# Patient Record
Sex: FEMALE | Race: WHITE | Employment: FULL TIME | URBAN - METROPOLITAN AREA
[De-identification: names, ages, dates, MRNs, and addresses within clinical notes are randomized per-mention and may not be internally consistent; named-entity substitution may affect disease eponyms.]

---

## 2021-09-08 ENCOUNTER — VIRTUAL VISIT (OUTPATIENT)
Dept: PRIMARY CARE CLINIC | Age: 41
End: 2021-09-08
Payer: COMMERCIAL

## 2021-09-08 ENCOUNTER — TELEPHONE (OUTPATIENT)
Dept: PRIMARY CARE CLINIC | Age: 41
End: 2021-09-08

## 2021-09-08 DIAGNOSIS — Z76.89 ENCOUNTER TO ESTABLISH CARE: Primary | ICD-10-CM

## 2021-09-08 DIAGNOSIS — Z00.00 ENCOUNTER FOR ANNUAL PHYSICAL EXAM: Primary | ICD-10-CM

## 2021-09-08 DIAGNOSIS — E78.2 MIXED HYPERLIPIDEMIA: ICD-10-CM

## 2021-09-08 PROBLEM — E88.81 METABOLIC SYNDROME: Status: ACTIVE | Noted: 2021-03-16

## 2021-09-08 PROBLEM — E78.5 HYPERLIPIDEMIA: Status: ACTIVE | Noted: 2021-09-08

## 2021-09-08 PROBLEM — E88.810 METABOLIC SYNDROME: Status: ACTIVE | Noted: 2021-03-16

## 2021-09-08 PROCEDURE — 99203 OFFICE O/P NEW LOW 30 MIN: CPT | Performed by: STUDENT IN AN ORGANIZED HEALTH CARE EDUCATION/TRAINING PROGRAM

## 2021-09-08 RX ORDER — ALPRAZOLAM 0.25 MG/1
0.25 TABLET ORAL NIGHTLY PRN
COMMUNITY
Start: 2021-06-20

## 2021-09-08 SDOH — ECONOMIC STABILITY: FOOD INSECURITY: WITHIN THE PAST 12 MONTHS, YOU WORRIED THAT YOUR FOOD WOULD RUN OUT BEFORE YOU GOT MONEY TO BUY MORE.: NEVER TRUE

## 2021-09-08 SDOH — ECONOMIC STABILITY: FOOD INSECURITY: WITHIN THE PAST 12 MONTHS, THE FOOD YOU BOUGHT JUST DIDN'T LAST AND YOU DIDN'T HAVE MONEY TO GET MORE.: NEVER TRUE

## 2021-09-08 ASSESSMENT — ENCOUNTER SYMPTOMS
RHINORRHEA: 0
SHORTNESS OF BREATH: 0
VOMITING: 0
DIARRHEA: 0
CONSTIPATION: 0
COUGH: 0
NAUSEA: 0
SORE THROAT: 0

## 2021-09-08 ASSESSMENT — PATIENT HEALTH QUESTIONNAIRE - PHQ9
SUM OF ALL RESPONSES TO PHQ QUESTIONS 1-9: 0
SUM OF ALL RESPONSES TO PHQ9 QUESTIONS 1 & 2: 0
2. FEELING DOWN, DEPRESSED OR HOPELESS: 0
SUM OF ALL RESPONSES TO PHQ QUESTIONS 1-9: 0
SUM OF ALL RESPONSES TO PHQ QUESTIONS 1-9: 0
1. LITTLE INTEREST OR PLEASURE IN DOING THINGS: 0

## 2021-09-08 ASSESSMENT — SOCIAL DETERMINANTS OF HEALTH (SDOH): HOW HARD IS IT FOR YOU TO PAY FOR THE VERY BASICS LIKE FOOD, HOUSING, MEDICAL CARE, AND HEATING?: NOT HARD AT ALL

## 2021-09-08 NOTE — PROGRESS NOTES
Edmond Jain (:  1980) is a 39 y.o. female,New patient, here for evaluation of the following chief complaint(s): Establish Care         ASSESSMENT/PLAN:  1. Encounter to establish care  2. Mixed hyperlipidemia  She will go to get fasting labs  She will make an appointment for in clinic visit to go over labs. No follow-ups on file. SUBJECTIVE/OBJECTIVE:  HPI    Patient needs PCP and establish care. Is interested in bariatric surgery  Changed jobs recently, works at [employer]  Has tried adipex  Tried victoza and metformin didn't help  Qsymia made her nauseaus    Does not need any more benzos    Review of Systems   Constitutional: Negative for chills and fever. HENT: Negative for congestion, rhinorrhea and sore throat. Eyes: Negative for visual disturbance. Respiratory: Negative for cough and shortness of breath. Cardiovascular: Negative for chest pain. Gastrointestinal: Negative for constipation, diarrhea, nausea and vomiting. Endocrine: Negative for polydipsia and polyuria. Genitourinary: Negative for dysuria. Musculoskeletal: Negative for arthralgias. Skin: Negative for rash. Allergic/Immunologic: Negative for environmental allergies. Neurological: Negative for headaches. Psychiatric/Behavioral: The patient is not nervous/anxious. Patient-Reported Vitals 2021   Patient-Reported Weight 276lb   Patient-Reported Height 5'5.5\"        Physical Exam  Vitals reviewed. Constitutional:       General: She is not in acute distress. Appearance: Normal appearance. She is not ill-appearing. HENT:      Head: Normocephalic and atraumatic. Right Ear: External ear normal.      Left Ear: External ear normal.   Eyes:      General: No scleral icterus. Right eye: No discharge. Left eye: No discharge. Extraocular Movements: Extraocular movements intact.       Conjunctiva/sclera: Conjunctivae normal.   Pulmonary:      Effort: Pulmonary effort is normal. No respiratory distress. Musculoskeletal:      Cervical back: Neck supple. Skin:     Coloration: Skin is not jaundiced. Findings: No lesion or rash. Neurological:      Mental Status: She is alert. Cranial Nerves: No cranial nerve deficit. Coordination: Coordination normal.   Psychiatric:         Mood and Affect: Mood normal.                   Keyanna Umanzor, was evaluated through a synchronous (real-time) audio-video encounter. The patient (or guardian if applicable) is aware that this is a billable service. Verbal consent to proceed has been obtained within the past 12 months. The visit was conducted pursuant to the emergency declaration under the [address] [place] [address], [address] [place] [address] authority and the [employer] and [employer] General Act. Patient identification was verified, and a caregiver was present when appropriate. The patient was located in a state where the provider was credentialed to provide care. An electronic signature was used to authenticate this note.     --Erna Frye MD

## 2021-09-13 ENCOUNTER — TELEPHONE (OUTPATIENT)
Dept: PRIMARY CARE CLINIC | Age: 41
End: 2021-09-13

## 2021-09-13 NOTE — TELEPHONE ENCOUNTER
----- Message from David Nelson sent at 9/13/2021 10:54 AM EDT -----  Subject: Message to Provider    QUESTIONS  Information for Provider? Patient called in regarding EKG for pre op   surgery. Patient called to request an order. Patient has appointment   schedule for 9/24/21  ---------------------------------------------------------------------------  --------------  CALL BACK INFO  What is the best way for the office to contact you? OK to leave message on   voicemail  Preferred Call Back Phone Number? [phone]  ---------------------------------------------------------------------------  --------------  SCRIPT ANSWERS  Relationship to Patient?  Self

## 2021-09-24 ENCOUNTER — OFFICE VISIT (OUTPATIENT)
Dept: PRIMARY CARE CLINIC | Age: 41
End: 2021-09-24
Payer: COMMERCIAL

## 2021-09-24 VITALS
HEIGHT: 66 IN | TEMPERATURE: 98.4 F | OXYGEN SATURATION: 98 % | SYSTOLIC BLOOD PRESSURE: 118 MMHG | WEIGHT: 276 LBS | DIASTOLIC BLOOD PRESSURE: 84 MMHG | HEART RATE: 73 BPM | BODY MASS INDEX: 44.36 KG/M2

## 2021-09-24 DIAGNOSIS — E66.01 CLASS 3 SEVERE OBESITY DUE TO EXCESS CALORIES WITHOUT SERIOUS COMORBIDITY WITH BODY MASS INDEX (BMI) OF 45.0 TO 49.9 IN ADULT (HCC): ICD-10-CM

## 2021-09-24 DIAGNOSIS — Z01.818 PREOP EXAMINATION: Primary | ICD-10-CM

## 2021-09-24 PROBLEM — E66.813 CLASS 3 SEVERE OBESITY IN ADULT: Status: ACTIVE | Noted: 2021-09-24

## 2021-09-24 PROCEDURE — 93000 ELECTROCARDIOGRAM COMPLETE: CPT | Performed by: STUDENT IN AN ORGANIZED HEALTH CARE EDUCATION/TRAINING PROGRAM

## 2021-09-24 PROCEDURE — 99241 PR OFFICE CONSULTATION NEW/ESTAB PATIENT 15 MIN: CPT | Performed by: STUDENT IN AN ORGANIZED HEALTH CARE EDUCATION/TRAINING PROGRAM

## 2021-09-24 NOTE — PROGRESS NOTES
Preoperative Consultation      Stephen Bernard  YOB: 1980    Date of Service:  9/24/2021    Vitals:    09/24/21 0924   BP: 118/84   Site: Right Upper Arm   Position: Sitting   Cuff Size: Large Adult   Pulse: 73   Temp: 98.4 °F (36.9 °C)   TempSrc: Oral   SpO2: 98%   Weight: 276 lb (125.2 kg)   Height: 5' 5.5\" (1.664 m)      Wt Readings from Last 2 Encounters:   09/24/21 276 lb (125.2 kg)     BP Readings from Last 3 Encounters:   09/24/21 118/84        Chief Complaint   Patient presents with    Pre-op Exam     bariatric surgery, no surgery date, Dr. Feliciano French     No Known Allergies  Outpatient Medications Marked as Taking for the 9/24/21 encounter (Office Visit) with Michael Goldman MD   Medication Sig Dispense Refill    etonogestrel (Delora Pedro) 76 MG implant Inject into the skin once         This patient presents to the office today for a preoperative consultation at the request of surgeon, Dr. Carlotta Schuler, who plans on performing briatric surgery with no set date yet at [address] [place] [address] to get bariatric surgery at [place] [address]. Has tried every single fad diet, working out with  has been to [employer] and food journaling, adipex, metformin, victoza and nothing worked, has had liposuction in 2018. Planned anesthesia: General   Known anesthesia problems: None   Bleeding risk: No recent or remote history of abnormal bleeding  Personal or FH of DVT/PE: No    Patient objection to receiving blood products: No    Patient Active Problem List   Diagnosis    Hyperlipidemia    Metabolic syndrome    Class 3 severe obesity in [facility])       No past medical history on file. No past surgical history on file.   Family History   Problem Relation Age of Onset    Thyroid Disease Mother     Other Mother     Cancer Maternal Grandmother     Heart Failure Maternal Grandfather     Diabetes Maternal Grandfather     Diabetes Paternal Grandfather     Heart Failure Paternal Grandfather      Social History     Socioeconomic History    Marital status: Not on file     Spouse name: Not on file    Number of children: Not on file    Years of education: Not on file    Highest education level: Not on file   Occupational History    Not on file   Tobacco Use    Smoking status: Never Smoker    Smokeless tobacco: Never Used   Vaping Use    Vaping Use: Never used   Substance and Sexual Activity    Alcohol use: Yes    Drug use: Not Currently    Sexual activity: Not on file   Other Topics Concern    Not on file   Social History Narrative    Not on file     Social Determinants of Health     Financial Resource Strain: Low Risk     Difficulty of Paying Living Expenses: Not hard at all   Food Insecurity: No Food Insecurity    Worried About Running Out of Food in the Last Year: Never true    [address] in the Last Year: Never true   Transportation Needs:     Lack of Transportation (Medical):  Lack of Transportation (Non-Medical):    Physical Activity:     Days of Exercise per Week:     Minutes of Exercise per Session:    Stress:     Feeling of Stress :    Social Connections:     Frequency of Communication with Friends and Family:     Frequency of Social Gatherings with Friends and Family:     Attends Alevism Services:     Active Member of Clubs or Organizations:     Attends Club or Organization Meetings:     Marital Status:    Intimate Partner Violence:     Fear of Current or Ex-Partner:     Emotionally Abused:     Physically Abused:     Sexually Abused:        Review of Systems  A comprehensive review of systems was negative except for what was noted in the HPI. Physical Exam   Constitutional: She is oriented to person, place, and time. She appears well-developed and well-nourished. No distress. HENT:   Head: Normocephalic and atraumatic.    Mouth/Throat: Uvula is midline, oropharynx is clear and moist and mucous membranes are normal.   Eyes: 09/14/2021 2.0     Total Bilirubin 09/14/2021 0.3     Alkaline Phosphatase 09/14/2021 92     ALT 09/14/2021 12     AST 09/14/2021 17     Globulin 09/14/2021 2.2     WBC 09/14/2021 7.0     RBC 09/14/2021 4.92     Hemoglobin 09/14/2021 14.2     Hematocrit 09/14/2021 43.4     MCV 09/14/2021 88.2     MCH 09/14/2021 28.8     MCHC 09/14/2021 32.6     RDW 09/14/2021 14.2     Platelets 12/78/9305 265     MPV 09/14/2021 9.5     Neutrophils % 09/14/2021 61.4     Lymphocytes % 09/14/2021 28.2     Monocytes % 09/14/2021 7.8     Eosinophils % 09/14/2021 1.8     Basophils % 09/14/2021 0.8     Neutrophils Absolute 09/14/2021 4.3     Lymphocytes Absolute 09/14/2021 2.0     Monocytes Absolute 09/14/2021 0.5     Eosinophils Absolute 09/14/2021 0.1     Basophils Absolute 09/14/2021 0.1            Assessment:       39 y.o. patient with planned surgery as above. Known risk factors for perioperative complications: None  Current medications which may produce withdrawal symptoms if withheld perioperatively: none      Plan:      Diagnosis Orders   1. Preop examination  EKG 12 lead   2. Class 3 severe obesity due to excess calories without serious comorbidity with body mass index (BMI) of 45.0 to 49.9 in adult [facility])       I highly recommend patient for bariatric surgery. She has struggled with her weight for many years and has done conservative measures as well as medications with no improvement in her weight. The higher the BMI the more at risk she is for serious illness/comorbidities in the future. By reducing her BMI we can be proactive in the prevention of illnesses such as hypertension/cardiovascular disease, diabetes, musculoskeletal issues in the future. Not to mention being morbidly obese does put her at high risk for having a poor outcome if she were to contract COVID-19.    1. Preoperative workup as follows: EKG WNL, patient had labs earlier this month that were unremarkable.   2. Change in medication regimen before surgery: None  3. Prophylaxis for cardiac events with perioperative beta-blockers: Not indicated  ACC/AHA indications for pre-operative beta-blocker use:    · Vascular surgery with history of postitive stress test  · Intermediate or high risk surgery with history of CAD   · Intermediate or high risk surgery with multiple clinical predictors of CAD- 2 of the following: history of compensated or prior heart failure, history of cerebrovascular disease, DM, or renal insufficiency    Routine administration of higher-dose, long-acting metoprolol in beta-blockernaïve patients on the day of surgery, and in the absence of dose titration is associated with an overall increase in mortality. Beta-blockers should be started days to weeks prior to surgery and titrated to pulse < 70.  4. Deep vein thrombosis prophylaxis: regimen to be chosen by surgical team  5.  No contraindications to planned surgery

## 2021-10-01 ENCOUNTER — TELEPHONE (OUTPATIENT)
Dept: PRIMARY CARE CLINIC | Age: 41
End: 2021-10-01

## 2021-10-08 ENCOUNTER — TELEPHONE (OUTPATIENT)
Dept: PRIMARY CARE CLINIC | Age: 41
End: 2021-10-08

## 2021-10-08 NOTE — TELEPHONE ENCOUNTER
Roly Hernandez called from [facility] about [place] [facility]. She wiil fax a paying out of network exception, waiver form. There is a PCP part that needs to be filled out by Dr. Ranjith Newton.

## 2021-10-13 ENCOUNTER — TELEPHONE (OUTPATIENT)
Dept: PRIMARY CARE CLINIC | Age: 41
End: 2021-10-13

## 2021-10-13 NOTE — TELEPHONE ENCOUNTER
Regarding 10/8/21 Encounter     [facility]  Fax number [phone]

## 2021-10-20 ENCOUNTER — TELEPHONE (OUTPATIENT)
Dept: PRIMARY CARE CLINIC | Age: 41
End: 2021-10-20

## 2021-12-14 RX ORDER — PHENOL 1.4 %
AEROSOL, SPRAY (ML) MUCOUS MEMBRANE
COMMUNITY

## 2021-12-14 NOTE — PROGRESS NOTES
[facility] patients having surgery or anesthesia are required to be Covid tested OR to have been vaccinated at least 14 days prior to your procedure. It is very important to return our call to [phone] and notify the staff of your last vaccination date otherwise you will be required to complete Covid PCR test within the 5-6 days prior to surgery & quarantine. The results will need to be faxed to PreAdmission Testing at [phone]. PRIOR TO PROCEDURE DATE:        1. PLEASE FOLLOW ANY  GUIDELINES/ INSTRUCTIONS PRIOR TO YOUR PROCEDURE AS ADVISED BY YOUR SURGEON. 2. Arrange for someone to drive you home and be with you for the first 24 hours after discharge for your safety after your procedure for which you received sedation. Ensure it is someone we can share information with regarding your discharge. 3. You must contact your surgeon for instructions IF:   You are taking any blood thinners, aspirin, anti-inflammatory or vitamin E.   There is a change in your physical condition such as a cold, fever, rash, cuts, sores or any other infection, especially near your surgical site. 4. Do not drink alcohol the day before or day of your procedure. 5. A Pre-op History and Physical for surgery MUST be completed by your Physician or Urgent Care within 30 days of your procedure date. Please bring a copy with you on the day of your procedure and along with any other testing performed. THE DAY OF YOUR PROCEDURE:  1. Follow instructions for ARRIVAL TIME as DIRECTED BY YOUR SURGEON. 2. Enter the MAIN entrance from [address] [place] [address] and follow the signs to the free [employer] or [employer] parking (offered free of charge 6am-5pm). 3. Enter the Main Entrance of the hospital (do not enter from the lower level of the parking garage). Upon entrance, check in with the  at the main desk on your left. by a healthcare worker using a special clippers designed to avoid skin irritation. 5. When you are in the operating room, your surgical site will be cleansed with a special soap, and in most cases, you will be given an antibiotic before the surgery begins. What to expect AFTER YOUR PROCEDURE:  1. Immediately following your procedure, your will be taken to the PACU for the first phase of your recovery. Your nurse will help you recover from any potential side effects of anesthesia, such as extreme drowsiness, changes in your vital signs or breathing patterns. Nausea, headache, muscle aches, or sore throat may also occur after anesthesia. Your nurse will help you manage these potential side effects. 2. For comfort and safety, arrange to have someone at home with you for the first 24 hours after discharge. 3. You and your family will be given written instructions about your diet, activity, dressing care, medications, and return visits. 4. Once at home, should issues with nausea, pain, or bleeding occur, or should you notice any signs of infection, you should call your surgeon. 5. Always clean your hands before and after caring for your wound. Do not let your family touch your surgery site without cleaning their hands. 6. Narcotic pain medications can cause significant constipation. You may want to add a stool softener to your postoperative medication schedule or speak to your surgeon on how best to manage this SIDE EFFECT. SPECIAL INSTRUCTIONS none    Thank you for allowing us to care for you. We strive to exceed your expectations in the delivery of care and service provided to you and your family. If you need to contact the [place] [address] staff for any reason, please call us at [phone]    Instructions reviewed with patient during preadmission testing phone interview.   Jeremi Salgado RN.12/14/2021 .8:29 AM      ADDITIONAL EDUCATIONAL INFORMATION REVIEWED PER PHONE WITH YOU AND/OR YOUR FAMILY:  No Hibiclens® Bathing Instructions   Yes Antibacterial Soap

## 2021-12-17 ENCOUNTER — ANESTHESIA EVENT (OUTPATIENT)
Dept: OPERATING ROOM | Age: 41
DRG: 621 | End: 2021-12-17
Payer: COMMERCIAL

## 2021-12-20 ENCOUNTER — ANESTHESIA (OUTPATIENT)
Dept: OPERATING ROOM | Age: 41
DRG: 621 | End: 2021-12-20
Payer: COMMERCIAL

## 2021-12-20 ENCOUNTER — HOSPITAL ENCOUNTER (INPATIENT)
Age: 41
LOS: 1 days | Discharge: HOME | DRG: 621 | End: 2021-12-21
Attending: SURGERY | Admitting: SURGERY
Payer: COMMERCIAL

## 2021-12-20 VITALS
OXYGEN SATURATION: 100 % | SYSTOLIC BLOOD PRESSURE: 125 MMHG | RESPIRATION RATE: 17 BRPM | DIASTOLIC BLOOD PRESSURE: 57 MMHG | TEMPERATURE: 98.6 F

## 2021-12-20 DIAGNOSIS — E66.01 MORBID OBESITY (HCC): ICD-10-CM

## 2021-12-20 LAB
GLUCOSE BLD-MCNC: 75 MG/DL (ref 70–99)
INR BLD: 1.13 (ref 0.88–1.12)
PERFORMED ON: NORMAL
PREGNANCY, URINE: NEGATIVE
PROTHROMBIN TIME: 12.8 SEC (ref 9.9–12.7)

## 2021-12-20 PROCEDURE — 2500000003 HC RX 250 WO HCPCS: Performed by: SURGERY

## 2021-12-20 PROCEDURE — 7100000001 HC PACU RECOVERY - ADDTL 15 MIN: Performed by: SURGERY

## 2021-12-20 PROCEDURE — 6360000002 HC RX W HCPCS: Performed by: SURGERY

## 2021-12-20 PROCEDURE — 3600000014 HC SURGERY LEVEL 4 ADDTL 15MIN: Performed by: SURGERY

## 2021-12-20 PROCEDURE — 88307 TISSUE EXAM BY PATHOLOGIST: CPT

## 2021-12-20 PROCEDURE — 6360000002 HC RX W HCPCS: Performed by: ANESTHESIOLOGY

## 2021-12-20 PROCEDURE — 0DB64Z3 EXCISION OF STOMACH, PERCUTANEOUS ENDOSCOPIC APPROACH, VERTICAL: ICD-10-PCS | Performed by: SURGERY

## 2021-12-20 PROCEDURE — 85610 PROTHROMBIN TIME: CPT

## 2021-12-20 PROCEDURE — 6370000000 HC RX 637 (ALT 250 FOR IP): Performed by: SURGERY

## 2021-12-20 PROCEDURE — 3700000001 HC ADD 15 MINUTES (ANESTHESIA): Performed by: SURGERY

## 2021-12-20 PROCEDURE — 6370000000 HC RX 637 (ALT 250 FOR IP): Performed by: STUDENT IN AN ORGANIZED HEALTH CARE EDUCATION/TRAINING PROGRAM

## 2021-12-20 PROCEDURE — 84703 CHORIONIC GONADOTROPIN ASSAY: CPT

## 2021-12-20 PROCEDURE — 2709999900 HC NON-CHARGEABLE SUPPLY: Performed by: SURGERY

## 2021-12-20 PROCEDURE — 3600000004 HC SURGERY LEVEL 4 BASE: Performed by: SURGERY

## 2021-12-20 PROCEDURE — 2720000010 HC SURG SUPPLY STERILE: Performed by: SURGERY

## 2021-12-20 PROCEDURE — 2500000003 HC RX 250 WO HCPCS: Performed by: NURSE ANESTHETIST, CERTIFIED REGISTERED

## 2021-12-20 PROCEDURE — 6360000002 HC RX W HCPCS: Performed by: NURSE ANESTHETIST, CERTIFIED REGISTERED

## 2021-12-20 PROCEDURE — 2580000003 HC RX 258: Performed by: SURGERY

## 2021-12-20 PROCEDURE — 0BQT4ZZ REPAIR DIAPHRAGM, PERCUTANEOUS ENDOSCOPIC APPROACH: ICD-10-PCS | Performed by: SURGERY

## 2021-12-20 PROCEDURE — 2580000003 HC RX 258: Performed by: ANESTHESIOLOGY

## 2021-12-20 PROCEDURE — 3700000000 HC ANESTHESIA ATTENDED CARE: Performed by: SURGERY

## 2021-12-20 PROCEDURE — 7100000000 HC PACU RECOVERY - FIRST 15 MIN: Performed by: SURGERY

## 2021-12-20 PROCEDURE — 1200000000 HC SEMI PRIVATE

## 2021-12-20 RX ORDER — OXYCODONE HCL 5 MG/5 ML
10 SOLUTION, ORAL ORAL EVERY 4 HOURS PRN
Status: DISCONTINUED | OUTPATIENT
Start: 2021-12-20 | End: 2021-12-21 | Stop reason: HOSPADM

## 2021-12-20 RX ORDER — OXYCODONE HCL 5 MG/5 ML
5 SOLUTION, ORAL ORAL EVERY 4 HOURS PRN
Status: DISCONTINUED | OUTPATIENT
Start: 2021-12-20 | End: 2021-12-21 | Stop reason: HOSPADM

## 2021-12-20 RX ORDER — SODIUM CHLORIDE 0.9 % (FLUSH) 0.9 %
5-40 SYRINGE (ML) INJECTION EVERY 12 HOURS SCHEDULED
Status: DISCONTINUED | OUTPATIENT
Start: 2021-12-20 | End: 2021-12-20 | Stop reason: HOSPADM

## 2021-12-20 RX ORDER — HYDROMORPHONE HCL 110MG/55ML
PATIENT CONTROLLED ANALGESIA SYRINGE INTRAVENOUS PRN
Status: DISCONTINUED | OUTPATIENT
Start: 2021-12-20 | End: 2021-12-20 | Stop reason: SDUPTHER

## 2021-12-20 RX ORDER — BUPIVACAINE HYDROCHLORIDE AND EPINEPHRINE 5; 5 MG/ML; UG/ML
INJECTION, SOLUTION EPIDURAL; INTRACAUDAL; PERINEURAL PRN
Status: DISCONTINUED | OUTPATIENT
Start: 2021-12-20 | End: 2021-12-20 | Stop reason: HOSPADM

## 2021-12-20 RX ORDER — PROCHLORPERAZINE EDISYLATE 5 MG/ML
5 INJECTION INTRAMUSCULAR; INTRAVENOUS
Status: DISCONTINUED | OUTPATIENT
Start: 2021-12-20 | End: 2021-12-20 | Stop reason: HOSPADM

## 2021-12-20 RX ORDER — LIDOCAINE HYDROCHLORIDE 20 MG/ML
INJECTION, SOLUTION INFILTRATION; PERINEURAL PRN
Status: DISCONTINUED | OUTPATIENT
Start: 2021-12-20 | End: 2021-12-20 | Stop reason: SDUPTHER

## 2021-12-20 RX ORDER — MEPERIDINE HYDROCHLORIDE 25 MG/ML
12.5 INJECTION INTRAMUSCULAR; INTRAVENOUS; SUBCUTANEOUS EVERY 5 MIN PRN
Status: DISCONTINUED | OUTPATIENT
Start: 2021-12-20 | End: 2021-12-20 | Stop reason: HOSPADM

## 2021-12-20 RX ORDER — SODIUM CHLORIDE 0.9 % (FLUSH) 0.9 %
5-40 SYRINGE (ML) INJECTION PRN
Status: DISCONTINUED | OUTPATIENT
Start: 2021-12-20 | End: 2021-12-20 | Stop reason: HOSPADM

## 2021-12-20 RX ORDER — SODIUM CHLORIDE 9 MG/ML
25 INJECTION, SOLUTION INTRAVENOUS PRN
Status: DISCONTINUED | OUTPATIENT
Start: 2021-12-20 | End: 2021-12-21 | Stop reason: HOSPADM

## 2021-12-20 RX ORDER — SODIUM CHLORIDE 0.9 % (FLUSH) 0.9 %
5-40 SYRINGE (ML) INJECTION PRN
Status: DISCONTINUED | OUTPATIENT
Start: 2021-12-20 | End: 2021-12-21 | Stop reason: HOSPADM

## 2021-12-20 RX ORDER — METOCLOPRAMIDE HYDROCHLORIDE 5 MG/ML
10 INJECTION INTRAMUSCULAR; INTRAVENOUS ONCE
Status: COMPLETED | OUTPATIENT
Start: 2021-12-20 | End: 2021-12-20

## 2021-12-20 RX ORDER — ONDANSETRON 2 MG/ML
4 INJECTION INTRAMUSCULAR; INTRAVENOUS EVERY 6 HOURS PRN
Status: DISCONTINUED | OUTPATIENT
Start: 2021-12-20 | End: 2021-12-21 | Stop reason: HOSPADM

## 2021-12-20 RX ORDER — SODIUM CHLORIDE 0.9 % (FLUSH) 0.9 %
5-40 SYRINGE (ML) INJECTION EVERY 12 HOURS SCHEDULED
Status: DISCONTINUED | OUTPATIENT
Start: 2021-12-20 | End: 2021-12-21 | Stop reason: HOSPADM

## 2021-12-20 RX ORDER — 0.9 % SODIUM CHLORIDE 0.9 %
500 INTRAVENOUS SOLUTION INTRAVENOUS
Status: DISCONTINUED | OUTPATIENT
Start: 2021-12-20 | End: 2021-12-20 | Stop reason: HOSPADM

## 2021-12-20 RX ORDER — SODIUM CHLORIDE, SODIUM LACTATE, POTASSIUM CHLORIDE, CALCIUM CHLORIDE 600; 310; 30; 20 MG/100ML; MG/100ML; MG/100ML; MG/100ML
INJECTION, SOLUTION INTRAVENOUS CONTINUOUS
Status: DISCONTINUED | OUTPATIENT
Start: 2021-12-20 | End: 2021-12-21 | Stop reason: HOSPADM

## 2021-12-20 RX ORDER — ONDANSETRON 2 MG/ML
4 INJECTION INTRAMUSCULAR; INTRAVENOUS
Status: COMPLETED | OUTPATIENT
Start: 2021-12-20 | End: 2021-12-20

## 2021-12-20 RX ORDER — SODIUM CHLORIDE, SODIUM LACTATE, POTASSIUM CHLORIDE, CALCIUM CHLORIDE 600; 310; 30; 20 MG/100ML; MG/100ML; MG/100ML; MG/100ML
INJECTION, SOLUTION INTRAVENOUS CONTINUOUS
Status: DISCONTINUED | OUTPATIENT
Start: 2021-12-20 | End: 2021-12-20

## 2021-12-20 RX ORDER — SODIUM CHLORIDE 9 MG/ML
INJECTION, SOLUTION INTRAVENOUS CONTINUOUS
Status: DISCONTINUED | OUTPATIENT
Start: 2021-12-20 | End: 2021-12-20

## 2021-12-20 RX ORDER — ACETAMINOPHEN 160 MG/5ML
650 SOLUTION ORAL
Status: COMPLETED | OUTPATIENT
Start: 2021-12-20 | End: 2021-12-20

## 2021-12-20 RX ORDER — HYDRALAZINE HYDROCHLORIDE 20 MG/ML
5 INJECTION INTRAMUSCULAR; INTRAVENOUS EVERY 10 MIN PRN
Status: DISCONTINUED | OUTPATIENT
Start: 2021-12-20 | End: 2021-12-20 | Stop reason: HOSPADM

## 2021-12-20 RX ORDER — LIDOCAINE HYDROCHLORIDE 10 MG/ML
1 INJECTION, SOLUTION EPIDURAL; INFILTRATION; INTRACAUDAL; PERINEURAL
Status: DISCONTINUED | OUTPATIENT
Start: 2021-12-20 | End: 2021-12-20 | Stop reason: HOSPADM

## 2021-12-20 RX ORDER — ONDANSETRON 2 MG/ML
INJECTION INTRAMUSCULAR; INTRAVENOUS PRN
Status: DISCONTINUED | OUTPATIENT
Start: 2021-12-20 | End: 2021-12-20 | Stop reason: SDUPTHER

## 2021-12-20 RX ORDER — PANTOPRAZOLE SODIUM 40 MG/10ML
40 INJECTION, POWDER, LYOPHILIZED, FOR SOLUTION INTRAVENOUS DAILY
Status: DISCONTINUED | OUTPATIENT
Start: 2021-12-21 | End: 2021-12-21 | Stop reason: HOSPADM

## 2021-12-20 RX ORDER — DEXAMETHASONE SODIUM PHOSPHATE 4 MG/ML
10 INJECTION, SOLUTION INTRA-ARTICULAR; INTRALESIONAL; INTRAMUSCULAR; INTRAVENOUS; SOFT TISSUE ONCE
Status: COMPLETED | OUTPATIENT
Start: 2021-12-20 | End: 2021-12-20

## 2021-12-20 RX ORDER — HYDROCODONE BITARTRATE AND ACETAMINOPHEN 5; 325 MG/1; MG/1
1 TABLET ORAL
Status: DISCONTINUED | OUTPATIENT
Start: 2021-12-20 | End: 2021-12-20 | Stop reason: HOSPADM

## 2021-12-20 RX ORDER — DIPHENHYDRAMINE HYDROCHLORIDE 50 MG/ML
12.5 INJECTION INTRAMUSCULAR; INTRAVENOUS
Status: DISCONTINUED | OUTPATIENT
Start: 2021-12-20 | End: 2021-12-20 | Stop reason: HOSPADM

## 2021-12-20 RX ORDER — APREPITANT 40 MG/1
40 CAPSULE ORAL ONCE
Status: COMPLETED | OUTPATIENT
Start: 2021-12-20 | End: 2021-12-20

## 2021-12-20 RX ORDER — ROCURONIUM BROMIDE 10 MG/ML
INJECTION, SOLUTION INTRAVENOUS PRN
Status: DISCONTINUED | OUTPATIENT
Start: 2021-12-20 | End: 2021-12-20 | Stop reason: SDUPTHER

## 2021-12-20 RX ORDER — ONDANSETRON 2 MG/ML
4 INJECTION INTRAMUSCULAR; INTRAVENOUS ONCE
Status: COMPLETED | OUTPATIENT
Start: 2021-12-20 | End: 2021-12-20

## 2021-12-20 RX ORDER — PROPOFOL 10 MG/ML
INJECTION, EMULSION INTRAVENOUS PRN
Status: DISCONTINUED | OUTPATIENT
Start: 2021-12-20 | End: 2021-12-20 | Stop reason: SDUPTHER

## 2021-12-20 RX ORDER — MAGNESIUM HYDROXIDE 1200 MG/15ML
LIQUID ORAL CONTINUOUS PRN
Status: COMPLETED | OUTPATIENT
Start: 2021-12-20 | End: 2021-12-20

## 2021-12-20 RX ORDER — HEPARIN SODIUM 5000 [USP'U]/ML
5000 INJECTION, SOLUTION INTRAVENOUS; SUBCUTANEOUS ONCE
Status: COMPLETED | OUTPATIENT
Start: 2021-12-20 | End: 2021-12-20

## 2021-12-20 RX ORDER — SODIUM CHLORIDE 9 MG/ML
25 INJECTION, SOLUTION INTRAVENOUS PRN
Status: DISCONTINUED | OUTPATIENT
Start: 2021-12-20 | End: 2021-12-20 | Stop reason: HOSPADM

## 2021-12-20 RX ADMIN — CEFAZOLIN 3000 MG: 10 INJECTION, POWDER, FOR SOLUTION INTRAVENOUS at 14:45

## 2021-12-20 RX ADMIN — HYOSCYAMINE SULFATE 125 MCG: 0.12 TABLET, ORALLY DISINTEGRATING ORAL at 20:25

## 2021-12-20 RX ADMIN — HEPARIN SODIUM 5000 UNITS: 5000 INJECTION INTRAVENOUS; SUBCUTANEOUS at 13:22

## 2021-12-20 RX ADMIN — LIDOCAINE HYDROCHLORIDE 100 MG: 20 INJECTION, SOLUTION INFILTRATION; PERINEURAL at 14:37

## 2021-12-20 RX ADMIN — ROCURONIUM BROMIDE 100 MG: 10 INJECTION INTRAVENOUS at 14:37

## 2021-12-20 RX ADMIN — HYDROMORPHONE HYDROCHLORIDE 0.5 MG: 1 INJECTION, SOLUTION INTRAMUSCULAR; INTRAVENOUS; SUBCUTANEOUS at 19:56

## 2021-12-20 RX ADMIN — HYDROMORPHONE HYDROCHLORIDE 0.5 MG: 1 INJECTION, SOLUTION INTRAMUSCULAR; INTRAVENOUS; SUBCUTANEOUS at 16:58

## 2021-12-20 RX ADMIN — OXYCODONE HYDROCHLORIDE 5 MG: 5 SOLUTION ORAL at 23:16

## 2021-12-20 RX ADMIN — SODIUM CHLORIDE, POTASSIUM CHLORIDE, SODIUM LACTATE AND CALCIUM CHLORIDE: 600; 310; 30; 20 INJECTION, SOLUTION INTRAVENOUS at 13:13

## 2021-12-20 RX ADMIN — PROPOFOL 200 MG: 10 INJECTION, EMULSION INTRAVENOUS at 14:37

## 2021-12-20 RX ADMIN — ONDANSETRON 8 MG: 2 INJECTION INTRAMUSCULAR; INTRAVENOUS at 14:34

## 2021-12-20 RX ADMIN — SODIUM CHLORIDE, POTASSIUM CHLORIDE, SODIUM LACTATE AND CALCIUM CHLORIDE: 600; 310; 30; 20 INJECTION, SOLUTION INTRAVENOUS at 15:27

## 2021-12-20 RX ADMIN — FAMOTIDINE 20 MG: 10 INJECTION, SOLUTION INTRAVENOUS at 13:15

## 2021-12-20 RX ADMIN — DEXAMETHASONE SODIUM PHOSPHATE 10 MG: 4 INJECTION, SOLUTION INTRAMUSCULAR; INTRAVENOUS at 13:15

## 2021-12-20 RX ADMIN — SUGAMMADEX 300 MG: 100 INJECTION, SOLUTION INTRAVENOUS at 15:35

## 2021-12-20 RX ADMIN — METOCLOPRAMIDE 10 MG: 5 INJECTION, SOLUTION INTRAMUSCULAR; INTRAVENOUS at 13:15

## 2021-12-20 RX ADMIN — APREPITANT 40 MG: 40 CAPSULE ORAL at 13:15

## 2021-12-20 RX ADMIN — ACETAMINOPHEN 650 MG: 650 SOLUTION ORAL at 13:14

## 2021-12-20 RX ADMIN — HYDROMORPHONE HYDROCHLORIDE 2 MG: 2 INJECTION, SOLUTION INTRAMUSCULAR; INTRAVENOUS; SUBCUTANEOUS at 14:37

## 2021-12-20 RX ADMIN — ONDANSETRON 4 MG: 2 INJECTION INTRAMUSCULAR; INTRAVENOUS at 16:04

## 2021-12-20 RX ADMIN — ONDANSETRON 4 MG: 2 INJECTION INTRAMUSCULAR; INTRAVENOUS at 13:24

## 2021-12-20 RX ADMIN — PROPOFOL 100 MG: 10 INJECTION, EMULSION INTRAVENOUS at 15:35

## 2021-12-20 RX ADMIN — FAMOTIDINE 20 MG: 10 INJECTION, SOLUTION INTRAVENOUS at 14:34

## 2021-12-20 ASSESSMENT — PULMONARY FUNCTION TESTS
PIF_VALUE: 0
PIF_VALUE: 27
PIF_VALUE: 0
PIF_VALUE: 0
PIF_VALUE: 21
PIF_VALUE: 27
PIF_VALUE: 29
PIF_VALUE: 28
PIF_VALUE: 29
PIF_VALUE: 28
PIF_VALUE: 29
PIF_VALUE: 30
PIF_VALUE: 29
PIF_VALUE: 23
PIF_VALUE: 29
PIF_VALUE: 28
PIF_VALUE: 30
PIF_VALUE: 28
PIF_VALUE: 14
PIF_VALUE: 21
PIF_VALUE: 28
PIF_VALUE: 23
PIF_VALUE: 28
PIF_VALUE: 28
PIF_VALUE: 26
PIF_VALUE: 28
PIF_VALUE: 29
PIF_VALUE: 26
PIF_VALUE: 23
PIF_VALUE: 28
PIF_VALUE: 28
PIF_VALUE: 15
PIF_VALUE: 28
PIF_VALUE: 27
PIF_VALUE: 28
PIF_VALUE: 17
PIF_VALUE: 28
PIF_VALUE: 23
PIF_VALUE: 22
PIF_VALUE: 28
PIF_VALUE: 27
PIF_VALUE: 28
PIF_VALUE: 23
PIF_VALUE: 29
PIF_VALUE: 23
PIF_VALUE: 28
PIF_VALUE: 23
PIF_VALUE: 17
PIF_VALUE: 22
PIF_VALUE: 0
PIF_VALUE: 1
PIF_VALUE: 0
PIF_VALUE: 29
PIF_VALUE: 23
PIF_VALUE: 29
PIF_VALUE: 28
PIF_VALUE: 17
PIF_VALUE: 28
PIF_VALUE: 1
PIF_VALUE: 16
PIF_VALUE: 17
PIF_VALUE: 1
PIF_VALUE: 27
PIF_VALUE: 28
PIF_VALUE: 23
PIF_VALUE: 27
PIF_VALUE: 28
PIF_VALUE: 24
PIF_VALUE: 29

## 2021-12-20 ASSESSMENT — PAIN SCALES - GENERAL
PAINLEVEL_OUTOF10: 7
PAINLEVEL_OUTOF10: 2
PAINLEVEL_OUTOF10: 0
PAINLEVEL_OUTOF10: 7
PAINLEVEL_OUTOF10: 6
PAINLEVEL_OUTOF10: 0
PAINLEVEL_OUTOF10: 4

## 2021-12-20 ASSESSMENT — PAIN DESCRIPTION - ONSET
ONSET: AWAKENED FROM SLEEP
ONSET: GRADUAL

## 2021-12-20 ASSESSMENT — PAIN DESCRIPTION - LOCATION
LOCATION: ABDOMEN
LOCATION: ABDOMEN

## 2021-12-20 ASSESSMENT — PAIN DESCRIPTION - DESCRIPTORS
DESCRIPTORS: BURNING;RADIATING
DESCRIPTORS: BURNING;SHARP;SHOOTING

## 2021-12-20 ASSESSMENT — PAIN DESCRIPTION - ORIENTATION
ORIENTATION: LEFT;UPPER
ORIENTATION: LEFT;UPPER

## 2021-12-20 ASSESSMENT — PAIN DESCRIPTION - FREQUENCY
FREQUENCY: CONTINUOUS
FREQUENCY: INTERMITTENT

## 2021-12-20 ASSESSMENT — PAIN DESCRIPTION - PAIN TYPE
TYPE: ACUTE PAIN;SURGICAL PAIN
TYPE: ACUTE PAIN;SURGICAL PAIN

## 2021-12-20 ASSESSMENT — PAIN DESCRIPTION - PROGRESSION: CLINICAL_PROGRESSION: GRADUALLY WORSENING

## 2021-12-20 ASSESSMENT — PAIN DESCRIPTION - DIRECTION: RADIATING_TOWARDS: NECK

## 2021-12-20 ASSESSMENT — LIFESTYLE VARIABLES: SMOKING_STATUS: 0

## 2021-12-20 NOTE — ANESTHESIA POSTPROCEDURE EVALUATION
Department of Anesthesiology  Postprocedure Note    Patient: Halbert Brittle  MRN: [id number]  YOB: 1980  Date of evaluation: 12/20/2021  Time:  6:08 PM     Procedure Summary     Date: 12/20/21 Room / Location: [facility] [address] / [facility]    Anesthesia Start: 1726 Anesthesia Stop: 4623    Procedure: LAPAROSCOPIC SLEEVE GASTRECTOMY, repair hiatal hernia (N/A ) Diagnosis:       Morbid obesity ([address]      (Morbid obesity ([facility] [address] [E66.01])    Surgeons: Marcello Mcgill DO Responsible Provider: Nathanael Osullivan DO    Anesthesia Type: general ASA Status: 3          Anesthesia Type: general    Netta Phase I: Netta Score: 8    Netta Phase II:      Last vitals: Reviewed and per EMR flowsheets.        Anesthesia Post Evaluation    Patient location during evaluation: PACU  Patient participation: complete - patient participated  Level of consciousness: awake and alert  Pain score: 0  Airway patency: patent  Nausea & Vomiting: no nausea and no vomiting  Complications: no  Cardiovascular status: hemodynamically stable  Respiratory status: acceptable  Hydration status: euvolemic

## 2021-12-20 NOTE — H&P
file   Social Connections:     Frequency of Communication with Friends and Family: Not on file    Frequency of Social Gatherings with Friends and Family: Not on file    Attends Anabaptist Services: Not on file    Active Member of Clubs or Organizations: Not on file    Attends Club or Organization Meetings: Not on file    Marital Status: Not on file   Intimate Partner Violence:     Fear of Current or Ex-Partner: Not on file    Emotionally Abused: Not on file    Physically Abused: Not on file    Sexually Abused: Not on file   Housing Stability:     Unable to Pay for Housing in the Last Year: Not on file    Number of [place] in the Last Year: Not on file    Unstable Housing in the Last Year: Not on file         Medications Prior to Admission:      Prior to Admission medications    Medication Sig Start Date End Date Taking? Authorizing Provider   Melatonin 10 MG TABS Take by mouth   Yes Historical Provider, MD   etonogestrel (Josh Travis) 68 MG implant Inject into the skin once 2/23/21  Yes Historical Provider, MD   ALPRAZolam (XANAX) 0.25 MG tablet 0.25 mg nightly as needed. To fall asleep  Patient not taking: Reported on 9/24/2021 6/20/21   Historical Provider, MD         Allergies:  Patient has no known allergies. PHYSICAL EXAM:      /78   Pulse 71   Temp 98.1 °F (36.7 °C) (Temporal)   Resp 15   Ht 5' 6\" (1.676 m)   Wt 262 lb 0.3 oz (118.9 kg)   SpO2 96%   BMI 42.29 kg/m²      Airway:  Airway patent with no audible stridor    Heart:  Regular rate and rhythm, no murmur noted    Lungs:  No increased work of breathing, good air exchange, clear to auscultation bilaterally, no crackles or wheezing    Abdomen:  Soft, non-distended, non-tender, no rebound tenderness or guarding, and no masses palpated    ASSESSMENT AND PLAN:    1. The patients history and physical was obtained and signed off by the pre-admission testing department.   Patient seen and focused exam done today- no new changes since last physical exam on 11/29/2021.    2.  Access to ancillary services are available per request of the provider. Yana Morrissey, DARYL - CNP     12/20/2021      Attending Addendum:    I have reviewed the notes, assessments, and/or procedures performed by the above author of the note,  In general I concur with her/his documentation of Cb Hung with the following additions or corrections: To OR for Lap Sleeve gastrectomy,   R/B/A discussed with pt in detail and she wishes to proceed.    All questions answered to Patients and her family satisfaction       [address] [place], [place],  12/20/2021 2:35 PM   Call my cell with any questions or concerns, [phone]

## 2021-12-20 NOTE — PROGRESS NOTES
Patient received from the OR to PACU #6 post LAPAROSCOPIC SLEEVE GASTRECTOMY, repair hiatal hernia of Dr. Sherryle Porch. Placed on PACU monitoring equipment. Report given per CRNA and  Dr. Laura Rodríguez. Per report, patient was stable during the procedure. On arrival, patient is arouseable, land and denies pain.

## 2021-12-20 NOTE — ANESTHESIA PRE PROCEDURE
Department of Anesthesiology  Preprocedure Note       Name:  Omar Zavala   Age:  39 y.o.  :  1980                                          MRN:  [id number]         Date:  2021      Surgeon: Rena Packer):  Darien Borrego DO    Procedure: Procedure(s):  LAPAROSCOPIC SLEEVE GASTRECTOMY    Medications prior to admission:   Prior to Admission medications    Medication Sig Start Date End Date Taking? Authorizing Provider   Melatonin 10 MG TABS Take by mouth   Yes Historical Provider, MD   etonogestrel (Ariadna Paling) 68 MG implant Inject into the skin once 21  Yes Historical Provider, MD   ALPRAZolam (XANAX) 0.25 MG tablet 0.25 mg nightly as needed.  To fall asleep  Patient not taking: Reported on 2021   Historical Provider, MD       Current medications:    Current Facility-Administered Medications   Medication Dose Route Frequency Provider Last Rate Last Admin    0.9 % sodium chloride infusion   IntraVENous Continuous Darien Borrego DO        heparin (porcine) injection 5,000 Units  5,000 Units SubCUTAneous Once Darien Borrego,         ceFAZolin (ANCEF) 3,000 mg in dextrose 5 % 100 mL IVPB  3,000 mg IntraVENous Once Darien Borrego, DO        aprepitant (EMEND) capsule 40 mg  40 mg Oral Once Darien Borrego, DO        ondansetron [facility]) injection 4 mg  4 mg IntraVENous Once Darien Borrego, DO        famotidine (PEPCID) injection 20 mg  20 mg IntraVENous Once Darien Borrego, DO        metoclopramide [facility]) injection 10 mg  10 mg IntraVENous Once Darien Borrego, DO        dexamethasone (DECADRON) injection 10 mg  10 mg IntraVENous Once Darien Borrego, DO        acetaminophen (TYLENOL) 160 MG/5ML solution 650 mg  650 mg Oral On Call to [facility], DO        lactated ringers infusion   IntraVENous Continuous Nicole Johnson MD        sodium chloride flush 0.9 % injection 5-40 mL  5-40 mL IntraVENous 2 times per day Alda Lorenzana MD        sodium chloride flush 0.9 % injection 5-40 mL  5-40 mL IntraVENous PRN Alda Lorenzana MD        0.9 % sodium chloride infusion  25 mL IntraVENous PRN Alda Lorenzana MD        lidocaine PF 1 % injection 1 mL  1 mL IntraDERmal Once PRN Alda Lorenzana MD        meperidine (DEMEROL) injection 12.5 mg  12.5 mg IntraVENous Q5 Min PRN Arvis Pennock, DO        HYDROmorphone (DILAUDID) injection 0.25 mg  0.25 mg IntraVENous Q5 Min PRN Arvis Pennock, DO        HYDROmorphone (DILAUDID) injection 0.5 mg  0.5 mg IntraVENous Q5 Min PRN Arvis Pennock, DO        HYDROcodone-acetaminophen [facility]) 5-325 MG per tablet 1 tablet  1 tablet Oral Once PRN Arvis Pennock, DO        ondansetron [facility]) injection 4 mg  4 mg IntraVENous Once PRN Arvis Pennock, DO        prochlorperazine (COMPAZINE) injection 5 mg  5 mg IntraVENous Once PRN Arvis Pennock, DO        0.9 % sodium chloride bolus  500 mL IntraVENous Once PRN Arvis Pennock, DO        diphenhydrAMINE (BENADRYL) injection 12.5 mg  12.5 mg IntraVENous Once PRN Arvis Pennock, DO        hydrALAZINE (APRESOLINE) injection 5 mg  5 mg IntraVENous Q10 Min PRN Arvis Pennock, DO           Allergies:  No Known Allergies    Problem List:    Patient Active Problem List   Diagnosis Code    Hyperlipidemia Y86.3    Metabolic syndrome Z38.56    Class 3 severe obesity in adult [facility]) E66.01       Past Medical History:  History reviewed. No pertinent past medical history. Past Surgical History:        Procedure Laterality Date    BREAST SURGERY      TONSILLECTOMY         Social History:    Social History     Tobacco Use    Smoking status: Never Smoker    Smokeless tobacco: Never Used   Substance Use Topics    Alcohol use:  Yes                                Counseling given: Not Answered      Vital Signs (Current):   Vitals:    12/14/21 0826   Weight: 275 lb (124.7 kg)   Height: 5' 6\" (1.676 m) BP Readings from Last 3 Encounters:   09/24/21 118/84       NPO Status: Time of last liquid consumption: 2300                        Time of last solid consumption: 2300                                                      BMI:   Wt Readings from Last 3 Encounters:   12/14/21 275 lb (124.7 kg)   09/24/21 276 lb (125.2 kg)     Body mass index is 44.39 kg/m². CBC:   Lab Results   Component Value Date    WBC 7.0 09/14/2021    RBC 4.92 09/14/2021    HGB 14.2 09/14/2021    HCT 43.4 09/14/2021    MCV 88.2 09/14/2021    RDW 14.2 09/14/2021     09/14/2021       CMP:   Lab Results   Component Value Date     09/14/2021    K 4.8 09/14/2021     09/14/2021    CO2 24 09/14/2021    BUN 9 09/14/2021    CREATININE 0.6 09/14/2021    GFRAA >60 09/14/2021    AGRATIO 2.0 09/14/2021    LABGLOM >60 09/14/2021    GLUCOSE 96 09/14/2021    PROT 6.5 09/14/2021    CALCIUM 8.8 09/14/2021    BILITOT 0.3 09/14/2021    ALKPHOS 92 09/14/2021    AST 17 09/14/2021    ALT 12 09/14/2021       POC Tests: No results for input(s): POCGLU, POCNA, POCK, POCCL, POCBUN, POCHEMO, POCHCT in the last 72 hours.     Coags: No results found for: PROTIME, INR, APTT    HCG (If Applicable): No results found for: PREGTESTUR, PREGSERUM, HCG, HCGQUANT     ABGs: No results found for: PHART, PO2ART, IOD3MNJ, IWC3PZG, BEART, O3YFVRZZ     Type & Screen (If Applicable):  No results found for: LABABO, LABRH    Drug/Infectious Status (If Applicable):  No results found for: HIV, HEPCAB    COVID-19 Screening (If Applicable): No results found for: COVID19        Anesthesia Evaluation  Patient summary reviewed and Nursing notes reviewed no history of anesthetic complications:   Airway: Mallampati: II  TM distance: >3 FB   Neck ROM: full  Mouth opening: > = 3 FB Dental: normal exam   (+) caps      Pulmonary: breath sounds clear to auscultation      (-) not a current smoker (never)                           Cardiovascular:  Exercise tolerance: good (>4 METS),       (-) past MI    NYHA Classification: II    Rhythm: regular  Rate: normal           Beta Blocker:  Not on Beta Blocker         Neuro/Psych:   Negative Neuro/Psych ROS              GI/Hepatic/Renal:   (+) morbid obesity     (-) GERD       Endo/Other: Negative Endo/Other ROS                    Abdominal:   (+) obese,           Vascular: negative vascular ROS. Other Findings:             Anesthesia Plan      general     ASA 3       Induction: intravenous. MIPS: Prophylactic antiemetics administered. Anesthetic plan and risks discussed with patient. Plan discussed with CRNA.     Attending anesthesiologist reviewed and agrees with Preprocedure content              Antwan Covarrubias DO   12/20/2021

## 2021-12-20 NOTE — FLOWSHEET NOTE
VSS. States that pain is \"tolerable\", no further complaints of nausea. Waiting on room to be assigned for admission. Will place in clinical discharge at this time and change VS to every hour and assessments to every 2 hours. Patient's mother brought into PACU for brief visit since room is not available and visiting hours will be over when assigned.

## 2021-12-20 NOTE — OP NOTE
Operative Note      Patient: Parminder Chiang  YOB: 1980  MRN: [id number]    Date of Procedure: 12/20/2021      Surgeon: Maine Campa DO    Assistant: Libia Hooker M.D. Facility: [facility]      Pre-operative Diagnosis:  -morbid obesity    Post-operative Diagnosis:  -morbid obesity  -hiatal hernia    Procedure:  Laparoscopic Sleeve Gastrectomy With Hiatal Hernia Repair  Bougie size: 40 [place]    Anesthesia: GETA    DVT Prophylaxis:  -heparin/LMWH+compression stockings    Estimated Blood Loss: minimal    Blood Transfusion: none    Complications: None      Indications for Procedure: This is an obese patient who I saw in consultation for weight loss surgery. This patient is a good candidate for the procedure and has been thoroughly educated on the risks and benefits of the procedure including the possibility of bleeding, infection, staple line leaks and even death. The patient has been extensively educated on post operative dietary requirements and the lifestyle changes needed for a successful outcome. The patient has made a commitment to make these necessary dietary and lifestyle changes for a successful outcome. Risks, benefits, and alternatives were discussed and the patient expresses full understanding and desires to proceed. Procedure Details: The patient was taken to the operating room and placed on the table in the supine position. General endotracheal anesthesia was administered and the abdomen was prepped and draped in the usual sterile fashion. DVT prophylaxis was administered as listed above. An incision was made above the umbilicus and access was gained using an optical non-bladed trocar. The abdomen was insufflated with carbon dioxide to 18 mm of mercury. The remaining trocars were inserted in standard position under direct visualization without complication. Adhesions were taken down as needed to facilitate exposure.  The patient was placed in slight repeated firings of the stapler until we completed the gastrectomy entirely. We withdrew the Bougie and inspected the sleeve and it laid out nicely with no spiraling or kinking. We inspected the staple line both anteriorly and posteriorly and it looked perfect. The excised gastric specimen was then removed through a trocar with a bag and sent to pathology. Fibrin sealant was applied to the entire staple line. A patch of omentum was sutured to the last 2-3 cm of staple line (up by the hiatus) in the area that is most prone to staple line leakage, to provide extra reinforcement in that region. After that, we inspected the staple lines and they all looked perfectly intact and there was no bleeding noted anywhere. We withdrew the trocars, made sure there was no bleeding, and closed the incisions with 4-0 Monocryl and Dermabond. The patient was then awakened, extubated, and taken to the recovery room in stable condition. There were no complications to this procedure and at the end all counts were correct.       1615 Arsenio Otto DO,    12/20/2021 3:48 PM

## 2021-12-21 VITALS
BODY MASS INDEX: 42.11 KG/M2 | OXYGEN SATURATION: 97 % | RESPIRATION RATE: 18 BRPM | HEIGHT: 66 IN | TEMPERATURE: 98.2 F | DIASTOLIC BLOOD PRESSURE: 72 MMHG | SYSTOLIC BLOOD PRESSURE: 125 MMHG | HEART RATE: 64 BPM | WEIGHT: 262.02 LBS

## 2021-12-21 LAB
ALBUMIN SERPL-MCNC: 3.9 G/DL (ref 3.4–5)
ANION GAP SERPL CALCULATED.3IONS-SCNC: 11 MMOL/L (ref 3–16)
BASOPHILS ABSOLUTE: 0 K/UL (ref 0–0.2)
BASOPHILS RELATIVE PERCENT: 0.1 %
BUN BLDV-MCNC: 10 MG/DL (ref 7–20)
CALCIUM SERPL-MCNC: 8.6 MG/DL (ref 8.3–10.6)
CHLORIDE BLD-SCNC: 104 MMOL/L (ref 99–110)
CO2: 18 MMOL/L (ref 21–32)
CREAT SERPL-MCNC: <0.5 MG/DL (ref 0.6–1.1)
EOSINOPHILS ABSOLUTE: 0 K/UL (ref 0–0.6)
EOSINOPHILS RELATIVE PERCENT: 0 %
GFR AFRICAN AMERICAN: >60
GFR NON-AFRICAN AMERICAN: >60
GLUCOSE BLD-MCNC: 105 MG/DL (ref 70–99)
HCT VFR BLD CALC: 41.1 % (ref 36–48)
HEMOGLOBIN: 13.6 G/DL (ref 12–16)
LYMPHOCYTES ABSOLUTE: 0.8 K/UL (ref 1–5.1)
LYMPHOCYTES RELATIVE PERCENT: 7.9 %
MAGNESIUM: 1.7 MG/DL (ref 1.8–2.4)
MCH RBC QN AUTO: 29.2 PG (ref 26–34)
MCHC RBC AUTO-ENTMCNC: 33.2 G/DL (ref 31–36)
MCV RBC AUTO: 88.1 FL (ref 80–100)
MONOCYTES ABSOLUTE: 0.4 K/UL (ref 0–1.3)
MONOCYTES RELATIVE PERCENT: 3.4 %
NEUTROPHILS ABSOLUTE: 9.2 K/UL (ref 1.7–7.7)
NEUTROPHILS RELATIVE PERCENT: 88.6 %
PDW BLD-RTO: 13.5 % (ref 12.4–15.4)
PHOSPHORUS: 2.7 MG/DL (ref 2.5–4.9)
PLATELET # BLD: 255 K/UL (ref 135–450)
PMV BLD AUTO: 9.5 FL (ref 5–10.5)
POTASSIUM SERPL-SCNC: 4.2 MMOL/L (ref 3.5–5.1)
RBC # BLD: 4.66 M/UL (ref 4–5.2)
SODIUM BLD-SCNC: 133 MMOL/L (ref 136–145)
WBC # BLD: 10.3 K/UL (ref 4–11)

## 2021-12-21 PROCEDURE — 6360000002 HC RX W HCPCS: Performed by: STUDENT IN AN ORGANIZED HEALTH CARE EDUCATION/TRAINING PROGRAM

## 2021-12-21 PROCEDURE — 2580000003 HC RX 258: Performed by: STUDENT IN AN ORGANIZED HEALTH CARE EDUCATION/TRAINING PROGRAM

## 2021-12-21 PROCEDURE — 85025 COMPLETE CBC W/AUTO DIFF WBC: CPT

## 2021-12-21 PROCEDURE — 6370000000 HC RX 637 (ALT 250 FOR IP): Performed by: STUDENT IN AN ORGANIZED HEALTH CARE EDUCATION/TRAINING PROGRAM

## 2021-12-21 PROCEDURE — 80069 RENAL FUNCTION PANEL: CPT

## 2021-12-21 PROCEDURE — C9113 INJ PANTOPRAZOLE SODIUM, VIA: HCPCS | Performed by: STUDENT IN AN ORGANIZED HEALTH CARE EDUCATION/TRAINING PROGRAM

## 2021-12-21 PROCEDURE — 36415 COLL VENOUS BLD VENIPUNCTURE: CPT

## 2021-12-21 PROCEDURE — 2500000003 HC RX 250 WO HCPCS: Performed by: STUDENT IN AN ORGANIZED HEALTH CARE EDUCATION/TRAINING PROGRAM

## 2021-12-21 PROCEDURE — 83735 ASSAY OF MAGNESIUM: CPT

## 2021-12-21 RX ORDER — MAGNESIUM SULFATE IN WATER 40 MG/ML
2000 INJECTION, SOLUTION INTRAVENOUS ONCE
Status: COMPLETED | OUTPATIENT
Start: 2021-12-21 | End: 2021-12-21

## 2021-12-21 RX ORDER — OMEPRAZOLE 20 MG/1
20 CAPSULE, DELAYED RELEASE ORAL DAILY
Qty: 30 CAPSULE | Refills: 3 | Status: SHIPPED | OUTPATIENT
Start: 2021-12-21

## 2021-12-21 RX ADMIN — SODIUM CHLORIDE, POTASSIUM CHLORIDE, SODIUM LACTATE AND CALCIUM CHLORIDE: 600; 310; 30; 20 INJECTION, SOLUTION INTRAVENOUS at 13:33

## 2021-12-21 RX ADMIN — HYOSCYAMINE SULFATE 125 MCG: 0.12 TABLET, ORALLY DISINTEGRATING ORAL at 03:55

## 2021-12-21 RX ADMIN — OXYCODONE HYDROCHLORIDE 10 MG: 5 SOLUTION ORAL at 08:50

## 2021-12-21 RX ADMIN — ENOXAPARIN SODIUM 40 MG: 100 INJECTION SUBCUTANEOUS at 08:50

## 2021-12-21 RX ADMIN — OXYCODONE HYDROCHLORIDE 10 MG: 5 SOLUTION ORAL at 13:31

## 2021-12-21 RX ADMIN — HYOSCYAMINE SULFATE 125 MCG: 0.12 TABLET, ORALLY DISINTEGRATING ORAL at 00:26

## 2021-12-21 RX ADMIN — HYOSCYAMINE SULFATE 125 MCG: 0.12 TABLET, ORALLY DISINTEGRATING ORAL at 09:07

## 2021-12-21 RX ADMIN — PANTOPRAZOLE SODIUM 40 MG: 40 INJECTION, POWDER, FOR SOLUTION INTRAVENOUS at 08:51

## 2021-12-21 RX ADMIN — SODIUM CHLORIDE, POTASSIUM CHLORIDE, SODIUM LACTATE AND CALCIUM CHLORIDE: 600; 310; 30; 20 INJECTION, SOLUTION INTRAVENOUS at 05:23

## 2021-12-21 RX ADMIN — MAGNESIUM SULFATE HEPTAHYDRATE 2000 MG: 2 INJECTION, SOLUTION INTRAVENOUS at 08:55

## 2021-12-21 RX ADMIN — OXYCODONE HYDROCHLORIDE 10 MG: 5 SOLUTION ORAL at 03:55

## 2021-12-21 RX ADMIN — HYOSCYAMINE SULFATE 125 MCG: 0.12 TABLET, ORALLY DISINTEGRATING ORAL at 13:09

## 2021-12-21 RX ADMIN — SODIUM PHOSPHATE, MONOBASIC, MONOHYDRATE 10 MMOL: 276; 142 INJECTION, SOLUTION INTRAVENOUS at 11:44

## 2021-12-21 RX ADMIN — SODIUM CHLORIDE, PRESERVATIVE FREE 10 ML: 5 INJECTION INTRAVENOUS at 08:51

## 2021-12-21 ASSESSMENT — PAIN SCALES - GENERAL
PAINLEVEL_OUTOF10: 4
PAINLEVEL_OUTOF10: 8
PAINLEVEL_OUTOF10: 5
PAINLEVEL_OUTOF10: 0
PAINLEVEL_OUTOF10: 10
PAINLEVEL_OUTOF10: 8

## 2021-12-21 ASSESSMENT — PAIN DESCRIPTION - LOCATION: LOCATION: ABDOMEN

## 2021-12-21 ASSESSMENT — PAIN DESCRIPTION - PROGRESSION: CLINICAL_PROGRESSION: GRADUALLY WORSENING

## 2021-12-21 ASSESSMENT — PAIN DESCRIPTION - ORIENTATION: ORIENTATION: LEFT

## 2021-12-21 ASSESSMENT — PAIN DESCRIPTION - FREQUENCY: FREQUENCY: INTERMITTENT

## 2021-12-21 ASSESSMENT — PAIN DESCRIPTION - DESCRIPTORS: DESCRIPTORS: BURNING

## 2021-12-21 ASSESSMENT — PAIN DESCRIPTION - ONSET: ONSET: ON-GOING

## 2021-12-21 ASSESSMENT — PAIN DESCRIPTION - PAIN TYPE: TYPE: SURGICAL PAIN

## 2021-12-21 NOTE — PROGRESS NOTES
Patient alert and oriented. VSS Patient c/o  Pain, Oxycodone given per protocol. CDI surgical sites, NINOSKA. Patient ambulates in wills. Patient in bed lowest position call light and bedside table within reach. All needs are met at this time. Patient aware to call if any help needed. Will continue to monitor  /79   Pulse 77   Temp 98.1 °F (36.7 °C) (Oral)   Resp 16   Ht 5' 6\" (1.676 m)   Wt 262 lb 0.3 oz (118.9 kg)   SpO2 95%   BMI 42.29 kg/m²

## 2021-12-21 NOTE — PROGRESS NOTES
PACU Transfer Note    Vitals:    12/20/21 2035   BP: 134/79   Pulse: 97   Resp: 18   Temp: 97.5 °F (36.4 °C)   SpO2: 97%       In: 1629 [P.O.:50; I.V.:1579]  Out: 0     Pain assessment:  present - adequately treated  Pain Level: 2    Report given to Receiving unit RN, Luis Lizarraga by phone. Transferred with all belongings to ready room per stretcher, per pacu transport. Cell phone in hand. Patient's mother updated per patient and made aware of room assignment.     12/20/2021 8:40 PM

## 2021-12-21 NOTE — PROGRESS NOTES
Pt a/o x4, VSS. Lungs remain clear, abdomen soft and appropriately tender, surgical sites CDI. Up SBA with steady and coordinated gait. Tolerating rocael clears without nausea. Pain managed with PRN oxy- pt has all d/c rx's at home already. Pt has been walking halls with staff, urinating freely. All needs met at this time.

## 2021-12-21 NOTE — PROGRESS NOTES
Surgery Daily Progress Note  Madhuri Quiroz  CC: Morbid obesity      Subjective : No acute events overnight. Tolerating BCLD. Ambulating and making adequate urine. Pain well controlled with medication      Objective    Infusions:   sodium chloride      lactated ringers 125 mL/hr at 12/21/21 0523        I/O:I/O last 3 completed shifts: In: 4399 [P.O.:50; I.V.:1579; IV Piggyback:100]  Out: 375 [Urine:375]           Wt Readings from Last 1 Encounters:   12/20/21 262 lb 0.3 oz (118.9 kg)                 LABS:  No results for input(s): WBC, HGB, HCT, MCV, PLT in the last 72 hours. No results for input(s): NA, K, CL, CO2, PHOS, BUN, CREATININE in the last 72 hours. Invalid input(s): CA   No results for input(s): AST, ALT, ALB, BILIDIR, BILITOT, ALKPHOS in the last 72 hours. No results for input(s): LIPASE, AMYLASE in the last 72 hours. Recent Labs     12/20/21  1310   INR 1.13*      No results for input(s): CKTOTAL, CKMB, CKMBINDEX, TROPONINI in the last 72 hours. Exam:/79   Pulse 77   Temp 98.1 °F (36.7 °C) (Oral)   Resp 16   Ht 5' 6\" (1.676 m)   Wt 262 lb 0.3 oz (118.9 kg)   SpO2 95%   BMI 42.29 kg/m²   General appearance: alert, appears stated age and cooperative  Eyes: No scleral icterus, EOM grossly intact  Neck: trachea midline, no JVD, no lymphadenopathy, neck supple  Chest/Lungs: CTAB, normal effort with no accessory muscle use on RA  Cardiovascular: RRR  Abdomen: Soft, appropriately-tender, incisions c/d/i and well approximated with Dermabond  Skin: warm and dry, no rashes  Extremities: no edema, no cyanosis  Genitourinary: Grossly normal  Neuro: A&Ox3, no focal deficits, sensation intact    ASSESSMENT/PLAN: Pt. is a 39 y.o. female s/p laparoscopic sleeve gastrectomy with hiatal hernia repair secondary to morbid obesity and hiatal hernia.  (12/20)     - continue to encourage OOB/Ambulation  - continue BCLD  - anticipate patient being able to be discharged if continues to progress        DARYL Moran - CNP 12/21/2021 6:27 AM  [phone]

## 2021-12-21 NOTE — PROGRESS NOTES
Bariatric Surgery  Post-operative Note      Procedure(s) Performed: Laparoscopic sleeve gastrectomy with hiatal hernia repair     Subjective:   Patient's pain is controlled, denies nausea or vomiting. Tolerating die. OOB, ambulating and making adequate urine. Denies flatus or BM at this time. Objective:  Anesthesia type: General      I/O    Intra op    Post op     Fluids  1000 mL 500 mL     EBL Minimal 0 mL     Urine 0 mL 200 mL     Vitals:   Vitals:    12/20/21 1800 12/20/21 1900 12/20/21 2035 12/20/21 2059   BP: 115/72 122/72 134/79 114/68   Pulse: 94 90 97 92   Resp: 17 15 18 16   Temp:   97.5 °F (36.4 °C) 98.2 °F (36.8 °C)   TempSrc:   Temporal Oral   SpO2: 95% 94% 97% 95%   Weight:       Height:           Physical Exam:  Post-op vital signs:  Stable   General appearance: alert, no acute distress, grooming appropriate  Eyes: No scleral icterus, EOM grossly intact  Neck: trachea midline, no JVD, no lymphadenopathy, neck supple  Chest/Lungs: CTAB, normal effort with no accessory muscle use on RA  Cardiovascular: RRR  Abdomen: Soft, appropriately-tender, incisions c/d/i and well approximated with Dermabond  Skin: warm and dry, no rashes  Extremities: no edema, no cyanosis  Genitourinary: Grossly normal  Neuro: A&Ox3, no focal deficits, sensation intact    Assessment and Plan  This is a 39y.o. year old female status post laparoscopic sleeve gastrectomy with hiatal hernia repair secondary to morbid obesity and hiatal hernia. (12/20) POD0. Pain management: Roxicodone pain panel and Dilaudid pain panel   Cardiovasc: hemodynamically stable, will continue to monitor  Respiratory:  IS ordered to bedside, encourage hourly IS and deep breathing, wean oxygen as tolerated  Fluids:   cc/hr, Diet: Clear liquid diet, bariatric  : Urine output is adequate   Ambulation: OOB to chair, encourage ambulation  Prophylaxis: SCDs, lovenox  Antibiotics: Patient received Ancef in the perioperative period.    Wound: Local wound care      Cheryl Hartley DO, [address]  PGY1, General Surgery  12/20/21  9:39 PM  [phone]

## 2021-12-21 NOTE — CARE COORDINATION
Case Management Assessment            Discharge Note                    Date / Time of Note: 12/21/2021 9:51 AM                  Discharge Note Completed by: Ros Woodson RN    Patient Name: Murtaza Be   YOB: 1980  Diagnosis: Morbid obesity ([facility] [address] [E66.01]   Date / Time: 12/20/2021 11:12 AM    Current PCP: Darrion Thao MD  Clinic patient: No    Hospitalization in the last 30 days: No    Advance Directives:  Code Status: Full Code  [place] DNR form completed and on chart: Not Indicated    Financial:  Payor: Harpreet Carreon / Plan: [address] [facility] EMP / Product Type: *No Product type* /      Pharmacy:    [facility] [address], [address] [facility]  [address] [address] [address]   Phone: [phone] Fax: [phone]      Assistance purchasing medications?:    Assistance provided by Case Management: None at this time    Does patient want to participate in local refill/ meds to beds program?:      Meds To Beds General Rules:  1. Can ONLY be done Monday- Friday between 8:30am-5pm  2. Prescription(s) must be in pharmacy by 3pm to be filled same day  3. Copy of patient's insurance/ prescription drug card and patient face sheet must be sent along with the prescription(s)  4. Cost of Rx cannot be added to hospital bill. If financial assistance is needed, please contact unit  or ;  or  CANNOT provide pharmacy voucher for patients co-pays  5.  Patients can then  the prescription on their way out of the hospital at discharge, or pharmacy can deliver to the bedside if staff is available. (payment due at time of pick-up or delivery - cash, check, or card accepted)     Able to afford home medications/ co-pay costs: Yes    ADLS:  Current PT AM-PAC Score:   /24  Current OT AM-PAC Score:   /24      DISCHARGE Disposition: Home- No Services Needed    LOC at discharge: Not Applicable  TARAH Completed: Not Indicated    Notification completed in HENS/PAS?:  Not Applicable    IMM Completed:   Not Indicated    Transportation:  Transportation PLAN for discharge: family   Mode of Transport: [address] ordered at discharge: Not [address]: Not Applicable  Orders faxed: No    Durable Medical Equipment:  DME Provider: none  Equipment obtained during hospitalization:     Home Oxygen and Respiratory Equipment:  Oxygen needed at discharge?: Not [address]: Not Applicable  Portable tank available for discharge?: Not Indicated    Dialysis:  Dialysis patient: No    Dialysis Center:  Not Applicable      Additional CM Notes: Pt from home will Dc home with family support no needs from CM. Will follow up OP with surgical team    The Plan for Transition of Care is related to the following treatment goals of Morbid obesity ([facility] [address] [E66.01]    The Patient and/or patient representative Keyanna Hale and her family were provided with a choice of provider and agrees with the discharge plan Yes    Freedom of choice list was provided with basic dialogue that supports the patient's individualized plan of care/goals and shares the quality data associated with the providers.  Yes    Care Transitions patient: Yes    Elli Elizalde RN  [facility] [employer]  Case Management Department  Ph: [phone]  Fax: [phone]

## 2021-12-21 NOTE — PROGRESS NOTES
4 Eyes Admission Assessment     I agree as the admission nurse that 2 RN's have performed a thorough Head to Toe Skin Assessment on the patient. ALL assessment sites listed below have been assessed on admission. Areas assessed by both nurses:   [x]   Head, Face, and Ears   [x]   Shoulders, Back, and Chest  [x]   Arms, Elbows, and Hands   [x]   Coccyx, Sacrum, and Ischium  [x]   Legs, Feet, and Heels         Does the Patient have Skin Breakdown?   No         Archie Prevention initiated:  No   Wound Care Orders initiated:  No      [facility] nurse consulted for Pressure Injury (Stage 3,4, Unstageable, DTI, NWPT, and Complex wounds) or Archie score 18 or lower:  No      Nurse 1 eSignature: Electronically signed by Gerber Quiroga RN on 12/21/21 at 7:46 AM EST    **SHARE this note so that the co-signing nurse is able to place an eSignature**    Nurse 2 eSignature: {Esignature:[id number]}

## 2022-01-03 ENCOUNTER — APPOINTMENT (OUTPATIENT)
Dept: GENERAL RADIOLOGY | Age: 42
End: 2022-01-03
Payer: COMMERCIAL

## 2022-01-03 ENCOUNTER — APPOINTMENT (OUTPATIENT)
Dept: CT IMAGING | Age: 42
End: 2022-01-03
Payer: COMMERCIAL

## 2022-01-03 ENCOUNTER — HOSPITAL ENCOUNTER (EMERGENCY)
Age: 42
Discharge: HOME | End: 2022-01-03
Attending: EMERGENCY MEDICINE
Payer: COMMERCIAL

## 2022-01-03 VITALS
TEMPERATURE: 97.7 F | WEIGHT: 254 LBS | DIASTOLIC BLOOD PRESSURE: 81 MMHG | OXYGEN SATURATION: 99 % | BODY MASS INDEX: 40.82 KG/M2 | RESPIRATION RATE: 18 BRPM | HEART RATE: 67 BPM | HEIGHT: 66 IN | SYSTOLIC BLOOD PRESSURE: 115 MMHG

## 2022-01-03 DIAGNOSIS — R10.12 LEFT UPPER QUADRANT ABDOMINAL PAIN: Primary | ICD-10-CM

## 2022-01-03 DIAGNOSIS — Z90.3 S/P GASTRIC SLEEVE PROCEDURE: ICD-10-CM

## 2022-01-03 LAB
ALBUMIN SERPL-MCNC: 3.9 G/DL (ref 3.4–5)
ALP BLD-CCNC: 81 U/L (ref 40–129)
ALT SERPL-CCNC: 22 U/L (ref 10–40)
ANION GAP SERPL CALCULATED.3IONS-SCNC: 9 MMOL/L (ref 3–16)
AST SERPL-CCNC: 34 U/L (ref 15–37)
BASOPHILS ABSOLUTE: 0 K/UL (ref 0–0.2)
BASOPHILS RELATIVE PERCENT: 0.4 %
BILIRUB SERPL-MCNC: <0.2 MG/DL (ref 0–1)
BILIRUBIN DIRECT: <0.2 MG/DL (ref 0–0.3)
BILIRUBIN, INDIRECT: NORMAL MG/DL (ref 0–1)
BUN BLDV-MCNC: 9 MG/DL (ref 7–20)
CALCIUM SERPL-MCNC: 8.7 MG/DL (ref 8.3–10.6)
CHLORIDE BLD-SCNC: 105 MMOL/L (ref 99–110)
CO2: 26 MMOL/L (ref 21–32)
CREAT SERPL-MCNC: 0.6 MG/DL (ref 0.6–1.1)
EKG ATRIAL RATE: 57 BPM
EKG DIAGNOSIS: NORMAL
EKG P AXIS: 47 DEGREES
EKG P-R INTERVAL: 202 MS
EKG Q-T INTERVAL: 408 MS
EKG QRS DURATION: 104 MS
EKG QTC CALCULATION (BAZETT): 397 MS
EKG R AXIS: -62 DEGREES
EKG T AXIS: 27 DEGREES
EKG VENTRICULAR RATE: 57 BPM
EOSINOPHILS ABSOLUTE: 0.1 K/UL (ref 0–0.6)
EOSINOPHILS RELATIVE PERCENT: 1.4 %
GFR AFRICAN AMERICAN: >60
GFR NON-AFRICAN AMERICAN: >60
GLUCOSE BLD-MCNC: 100 MG/DL (ref 70–99)
HCT VFR BLD CALC: 41.7 % (ref 36–48)
HEMOGLOBIN: 14.1 G/DL (ref 12–16)
LIPASE: 55 U/L (ref 13–60)
LYMPHOCYTES ABSOLUTE: 1.4 K/UL (ref 1–5.1)
LYMPHOCYTES RELATIVE PERCENT: 24.8 %
MCH RBC QN AUTO: 29.3 PG (ref 26–34)
MCHC RBC AUTO-ENTMCNC: 33.8 G/DL (ref 31–36)
MCV RBC AUTO: 86.7 FL (ref 80–100)
MONOCYTES ABSOLUTE: 0.4 K/UL (ref 0–1.3)
MONOCYTES RELATIVE PERCENT: 8.1 %
NEUTROPHILS ABSOLUTE: 3.6 K/UL (ref 1.7–7.7)
NEUTROPHILS RELATIVE PERCENT: 65.3 %
PDW BLD-RTO: 13.8 % (ref 12.4–15.4)
PLATELET # BLD: 226 K/UL (ref 135–450)
PMV BLD AUTO: 9.8 FL (ref 5–10.5)
POTASSIUM REFLEX MAGNESIUM: 3.9 MMOL/L (ref 3.5–5.1)
RBC # BLD: 4.81 M/UL (ref 4–5.2)
SODIUM BLD-SCNC: 140 MMOL/L (ref 136–145)
TOTAL PROTEIN: 6.5 G/DL (ref 6.4–8.2)
WBC # BLD: 5.5 K/UL (ref 4–11)

## 2022-01-03 PROCEDURE — 99284 EMERGENCY DEPT VISIT MOD MDM: CPT

## 2022-01-03 PROCEDURE — 83690 ASSAY OF LIPASE: CPT

## 2022-01-03 PROCEDURE — 36415 COLL VENOUS BLD VENIPUNCTURE: CPT

## 2022-01-03 PROCEDURE — 6360000004 HC RX CONTRAST MEDICATION: Performed by: NURSE PRACTITIONER

## 2022-01-03 PROCEDURE — 85025 COMPLETE CBC W/AUTO DIFF WBC: CPT

## 2022-01-03 PROCEDURE — 80048 BASIC METABOLIC PNL TOTAL CA: CPT

## 2022-01-03 PROCEDURE — 74177 CT ABD & PELVIS W/CONTRAST: CPT

## 2022-01-03 PROCEDURE — 80076 HEPATIC FUNCTION PANEL: CPT

## 2022-01-03 PROCEDURE — 71045 X-RAY EXAM CHEST 1 VIEW: CPT

## 2022-01-03 PROCEDURE — 93005 ELECTROCARDIOGRAM TRACING: CPT | Performed by: STUDENT IN AN ORGANIZED HEALTH CARE EDUCATION/TRAINING PROGRAM

## 2022-01-03 RX ORDER — LANOLIN ALCOHOL/MO/W.PET/CERES
1000 CREAM (GRAM) TOPICAL DAILY
COMMUNITY

## 2022-01-03 RX ORDER — METHOCARBAMOL 750 MG/1
750 TABLET, FILM COATED ORAL 3 TIMES DAILY
Qty: 30 TABLET | Refills: 0 | Status: SHIPPED | OUTPATIENT
Start: 2022-01-03 | End: 2022-01-13

## 2022-01-03 RX ORDER — MULTIVITAMIN WITH IRON
1 TABLET ORAL DAILY
Status: DISCONTINUED | OUTPATIENT
Start: 2022-01-03 | End: 2022-01-03

## 2022-01-03 RX ORDER — FOLIC ACID 1 MG/1
1 TABLET ORAL DAILY
Status: DISCONTINUED | OUTPATIENT
Start: 2022-01-03 | End: 2022-01-03

## 2022-01-03 RX ADMIN — IOPAMIDOL 80 ML: 755 INJECTION, SOLUTION INTRAVENOUS at 12:34

## 2022-01-03 RX ADMIN — IOHEXOL 50 ML: 240 INJECTION, SOLUTION INTRATHECAL; INTRAVASCULAR; INTRAVENOUS; ORAL at 12:34

## 2022-01-03 ASSESSMENT — ENCOUNTER SYMPTOMS
EYE REDNESS: 0
RHINORRHEA: 0
NAUSEA: 0
ABDOMINAL PAIN: 1
VOMITING: 0
PHOTOPHOBIA: 0
SHORTNESS OF BREATH: 0
CONSTIPATION: 0
BLOOD IN STOOL: 0
COUGH: 0
BACK PAIN: 0
DIARRHEA: 0

## 2022-01-03 ASSESSMENT — PAIN DESCRIPTION - LOCATION: LOCATION: ABDOMEN

## 2022-01-03 ASSESSMENT — PAIN DESCRIPTION - PROGRESSION: CLINICAL_PROGRESSION: GRADUALLY WORSENING

## 2022-01-03 ASSESSMENT — PAIN DESCRIPTION - FREQUENCY: FREQUENCY: INTERMITTENT

## 2022-01-03 ASSESSMENT — PAIN SCALES - GENERAL: PAINLEVEL_OUTOF10: 0

## 2022-01-03 ASSESSMENT — ACTIVITIES OF DAILY LIVING (ADL): EFFECT OF PAIN ON DAILY ACTIVITIES: WORSE WITH MOVEMENT

## 2022-01-03 NOTE — H&P
Department of General Surgery - Adult  Surgical Service Red Team  Nurse Practitioner         CHIEF COMPLAINT:  LUQ abdominal pain        History Obtained From:  patient, electronic medical record    HISTORY OF PRESENT ILLNESS:    The patient is a 39 y.o. female with a negative past medical history who presents to the ER with the c/o LUQ abdominal pain. The patient is s/p Laparoscopic sleeve gastrectomy with hiatal hernia repair 14 days ago. According to the patient, this pain has been present for the past 14 days. She describes the pain as intermittent. States it starts out sharp and then progresses more of a cramp/ache. She denies any c/o fever or chills. She denies any nausea or emesis. States she is having normal BM and denies any constipation. She denies any back pain or pain with urination. She has been seen by the Nurse Practitioner last week at her bariatric surgeons office who stated if the pain continues, she should contact her. The NP was contacted and the patient was instructed to present to the ER    Past Medical History:    History reviewed. No pertinent past medical history. Past Surgical History:        Procedure Laterality Date    BREAST SURGERY      SLEEVE GASTRECTOMY N/A 12/20/2021    LAPAROSCOPIC SLEEVE GASTRECTOMY, repair hiatal hernia performed by Kendy Paris DO at [address]       Immunizations:                Influenza:  Not indicated            Pneumococcal Polysaccharide:  Not indicated    Medications Prior to Admission:   Not in a hospital admission. Allergies:  Patient has no known allergies.     Social History:   TOBACCO:  Never used tobacco  Family History:       Problem Relation Age of Onset    Thyroid Disease Mother     Other Mother     Cancer Maternal Grandmother     Heart Failure Maternal Grandfather     Diabetes Maternal Grandfather     Diabetes Paternal Grandfather     Heart Failure Paternal Grandfather      REVIEW OF SYSTEMS:    Review of systems are considered negative except as listed below    PHYSICAL EXAM:    VITALS:  BP (!) 150/89   Pulse 69   Temp 97.7 °F (36.5 °C) (Oral)   Resp 18   Ht 5' 6\" (1.676 m)   Wt 254 lb (115.2 kg)   SpO2 96%   BMI 41.00 kg/m²   EYES:  Lids and lashes normal, pupils equal, round and reactive to light, extra ocular muscles intact, sclera clear, conjunctiva normal  NECK:  Supple, symmetrical, trachea midline, no adenopathy, thyroid symmetric, not enlarged and no tenderness, skin normal  LUNGS:  No increased work of breathing, good air exchange, clear to auscultation bilaterally, no crackles or wheezing  CARDIOVASCULAR:  Normal apical impulse, regular rate and rhythm, normal S1 and S2, no S3 or S4, and no murmur noted  ABDOMEN:  Soft, non tender to palpation. Trocar sites still present with surgical glue. Incisions are well approximated without evidence of active bleeding. No rigidity, no guarding, non peritoneal  MUSCULOSKELETAL:  There is no redness, warmth, or swelling of the joints. Full range of motion noted. Motor strength is 5 out of 5 all extremities bilaterally. Tone is normal.  NEUROLOGIC:  Awake, alert, oriented to name, place and time.      DATA:  CBC with Differential:    Lab Results   Component Value Date    WBC 10.3 12/21/2021    RBC 4.66 12/21/2021    HGB 13.6 12/21/2021    HCT 41.1 12/21/2021     12/21/2021    MCV 88.1 12/21/2021    MCH 29.2 12/21/2021    MCHC 33.2 12/21/2021    RDW 13.5 12/21/2021    LYMPHOPCT 7.9 12/21/2021    MONOPCT 3.4 12/21/2021    BASOPCT 0.1 12/21/2021    MONOSABS 0.4 12/21/2021    LYMPHSABS 0.8 12/21/2021    EOSABS 0.0 12/21/2021    BASOSABS 0.0 12/21/2021     BMP:    Lab Results   Component Value Date     12/21/2021    K 4.2 12/21/2021     12/21/2021    CO2 18 12/21/2021    BUN 10 12/21/2021    LABALBU 3.9 12/21/2021    CREATININE <0.5 12/21/2021    CALCIUM 8.6 12/21/2021    GFRAA >60 12/21/2021    LABGLOM >60 12/21/2021    GLUCOSE 105 12/21/2021 Hepatic Function Panel:    Lab Results   Component Value Date    ALKPHOS 92 09/14/2021    ALT 12 09/14/2021    AST 17 09/14/2021    PROT 6.5 09/14/2021    BILITOT 0.3 09/14/2021    LABALBU 3.9 12/21/2021     Albumin:    Lab Results   Component Value Date    LABALBU 3.9 12/21/2021     Magnesium:    Lab Results   Component Value Date    MG 1.70 12/21/2021     Phosphorus:    Lab Results   Component Value Date    PHOS 2.7 12/21/2021     PT/INR:    Lab Results   Component Value Date    PROTIME 12.8 12/20/2021    INR 1.13 12/20/2021     Current labs are still pending    ASSESSMENT AND PLAN:    38 y/o female presents to the ER with the c/o LUQ abdominal pain that has been present since her laparoscopic sleeve gastrectomy on 12/20/21    - will follow up labs  - patient states she can reproduce her pain with movement. This NP is unable to reproduce her pain with deep palpation of abdomen  - patient will need a CT to rule out any pathology of pain. Will order CT Abd/Pelvis with IV and PO contrast  - Discussed with Dr. Connor Andres NP-C  [phone]  Resident Support Staff    ADDENDUM:    Reviewed CT results with radiologist.  No evidence of leak or obstruction. WBC, Renal along with LFT are all within normal limits. Spoke with Dr. Connro Bella, will order one rally pack while in house to ensure patient is hydrated. Will also dc patient with Robaxin 750mg TID prn spasm. She can follow up prn    ELVIRA Andres NP-C  [phone]  Resident Support Staff

## 2022-01-03 NOTE — ED PROVIDER NOTES
ED Attending Attestation Note     Date of evaluation: 1/3/2022    This patient was seen by the resident. I agree with the workup, evaluation, management and diagnosis. The care plan has been discussed. My assessment reveals patient with recent gastric sleeve surgery here with abdominal pain. Labs and CT unremarkable. Seen by surgery and will start Robaxin per their recommendation as there is no identifiable abnormality of workup today.         Luis Miguel Bello MD  01/03/22 [id number]

## 2022-01-03 NOTE — ED PROVIDER NOTES
[address] ENCOUNTER          EM RESIDENT NOTE       Date of evaluation: 1/3/2022    Chief Complaint     Abdominal Pain (pain under left ribs consistently after gastric sleeve 2 weeks ago )      of Present Illness     Deedee Blakely is a 39 y.o. female who is POD#14 after gastric sleeve and hiatal hernia repair presents for evaluation of left upper quadrant abdominal pain. Patient reports she has had an aching, spasming charley horse type pain under her left breast since she woke up from the surgery. She reports this pain was most severe on the first couple days postoperatively and had improved some and was intermittent. She does report the pain seems to be lasting longer now, lasting up to 3 to 4 hours. She reports the pain is exacerbated by any movement or taking deep breaths. She denies pain worse with eating, nausea, vomiting. She has been advancing her diet well to puréed foods. Denies fevers, chills, chest pain, constipation, diarrhea, blood in stools, urinary sx, vaginal bleeding or discharge. Patient reports her surgery was uncomplicated. She reports she called her surgeon's office today and they recommended she become evaluated for postoperative complications. Review of Systems     Review of Systems   Constitutional: Negative for chills and fever. HENT: Negative for congestion and rhinorrhea. Eyes: Negative for photophobia, redness and visual disturbance. Respiratory: Negative for cough and shortness of breath. Cardiovascular: Negative for chest pain and leg swelling. Gastrointestinal: Positive for abdominal pain. Negative for blood in stool, constipation, diarrhea, nausea and vomiting. Genitourinary: Negative for dysuria and enuresis. Musculoskeletal: Negative for arthralgias and back pain. Skin: Negative for rash and wound. Neurological: Negative for dizziness, syncope and weakness.        A complete ROS was performed and is otherwise negative. Past Medical, Surgical, Family, and Social History     She has no past medical history on file. She has a past surgical history that includes Breast surgery; Tonsillectomy; and Sleeve Gastrectomy (N/A, 12/20/2021). Her family history includes Cancer in her maternal grandmother; Diabetes in her maternal grandfather and paternal grandfather; Heart Failure in her maternal grandfather and paternal grandfather; Other in her mother; Thyroid Disease in her mother. She reports that she has never smoked. She has never used smokeless tobacco. She reports previous drug use. She reports that she does not drink alcohol. Medications     Previous Medications    ALPRAZOLAM (XANAX) 0.25 MG TABLET    0.25 mg nightly as needed. To fall asleep    ETONOGESTREL (NEXPLANON) 68 MG IMPLANT    Inject into the skin once    MELATONIN 10 MG TABS    Take by mouth    OMEPRAZOLE (PRILOSEC) 20 MG DELAYED RELEASE CAPSULE    Take 1 capsule by mouth daily    VITAMIN B-12 (CYANOCOBALAMIN) 1000 MCG TABLET    Take 1,000 mcg by mouth daily       Allergies     She has No Known Allergies. Physical Exam     INITIAL VITALS: BP: (!) 150/89, Temp: 97.7 °F (36.5 °C), Pulse: 69, Resp: 18, SpO2: 96 %   Physical Exam  Vitals and nursing note reviewed. Constitutional:       General: She is not in acute distress. HENT:      Head: Normocephalic and atraumatic. Mouth/Throat:      Mouth: Mucous membranes are moist.   Eyes:      Extraocular Movements: Extraocular movements intact. Pupils: Pupils are equal, round, and reactive to light. Cardiovascular:      Rate and Rhythm: Normal rate and regular rhythm. Heart sounds: Normal heart sounds. No murmur heard. Pulmonary:      Effort: Pulmonary effort is normal. No respiratory distress. Breath sounds: Normal breath sounds. No wheezing or rales. Abdominal:      Palpations: Abdomen is soft. Tenderness: There is abdominal tenderness.       Comments: Mild left upper quadrant tenderness to palpation. No guarding or rebound. Her her laparoscopic port sites are healing well, clean dry and intact. No signs of postoperative hernia. Musculoskeletal:      Right lower leg: No edema. Left lower leg: No edema. Skin:     General: Skin is warm and dry. Neurological:      General: No focal deficit present. Mental Status: She is alert and oriented to person, place, and time. Psychiatric:         Mood and Affect: Mood normal.         Behavior: Behavior normal.         DiagnosticResults     EKG   Interpreted in conjunction with emergencydepartment physician No att. providers found  NSR, rate 57, LAD, incomplete RBBB, TWI V2, V3, no ST change, similar to prior EKG on 9/24/21. RADIOLOGY:  XR CHEST PORTABLE    (Results Pending)   CT ABDOMEN PELVIS W IV CONTRAST Additional Contrast? Oral    (Results Pending)       LABS:   Results for orders placed or performed during the hospital encounter of 01/03/22   CBC Auto Differential   Result Value Ref Range    WBC 5.5 4.0 - 11.0 K/uL    RBC 4.81 4.00 - 5.20 M/uL    Hemoglobin 14.1 12.0 - 16.0 g/dL    Hematocrit 41.7 36.0 - 48.0 %    MCV 86.7 80.0 - 100.0 fL    MCH 29.3 26.0 - 34.0 pg    MCHC 33.8 31.0 - 36.0 g/dL    RDW 13.8 12.4 - 15.4 %    Platelets 534 443 - 312 K/uL    MPV 9.8 5.0 - 10.5 fL    Neutrophils % 65.3 %    Lymphocytes % 24.8 %    Monocytes % 8.1 %    Eosinophils % 1.4 %    Basophils % 0.4 %    Neutrophils Absolute 3.6 1.7 - 7.7 K/uL    Lymphocytes Absolute 1.4 1.0 - 5.1 K/uL    Monocytes Absolute 0.4 0.0 - 1.3 K/uL    Eosinophils Absolute 0.1 0.0 - 0.6 K/uL    Basophils Absolute 0.0 0.0 - 0.2 K/uL       RECENT VITALS:  BP: 128/79, Temp: 97.7 °F (36.5 °C), Pulse: 69,Resp: 18, SpO2: 97 %       ED Course     Nursing Notes, Past Medical Hx, Past Surgical Hx, Social Hx, Allergies, and Family Hx were reviewed.     The patient was given the followingmedications:  No orders of the defined types were placed in this encounter. CONSULTS:  DENIS/ Deangelo Sams 19 / ASSESSMENT / Julianna Monday is a 39 y.o. female who is POD#14 after gastric sleeve and hiatal hernia repair presents for evaluation of worsening left upper quadrant abdominal pain that has been intermittent since her surgery. She is in no acute distress, afebrile, hemodynamically stable. Abdomen soft with mild LUQ TTP. No peritoneal signs. Labs with no leukocytosis or anemia, unremarkable BMP, lipase, LFTs. Unremarkable EKG and CXR. Discussed case with surgery who evaluated pt in ED. CT abdomen was obtained which demonstrated normal post-op change, no evidence of post-op complication such as leak or obstruction. Her pain is worst with any type of movement/use of abdominal muscles, no GI sx or difficulty with PO. I suspect this is abdominal wall pain. Will d/c with tylenol and robaxin. She has post-op f/u with Dr. Agrawal Divers scheduled. Discharged in stable condition. This patient was also evaluated by the attending physician. All care plans were discussed and agreed upon. Clinical Impression     1. Left upper quadrant abdominal pain    2. S/P gastric sleeve procedure        Disposition     PATIENT REFERRED TO:  No follow-up provider specified. DISCHARGE MEDICATIONS:  New Prescriptions    No medications on file       DISPOSITION    Discharged in stable condition.       Nic Powell MD  01/03/22 [id number]

## 2022-01-03 NOTE — ED TRIAGE NOTES
abd pain, L sided under ribs since gastric sleeve 2 weeks ago, progressively worsened, most severe x 2 days, no nausea or vomiting with it

## 2022-01-04 ENCOUNTER — CARE COORDINATION (OUTPATIENT)
Dept: OTHER | Facility: CLINIC | Age: 42
End: 2022-01-04

## 2022-01-04 NOTE — CARE COORDINATION
[facility] ED Follow Up Call    2022    Patient: Deedee Blakely Patient : 1980   MRN: [id number]  Reason for Admission: abdominal pain  Discharge Date: 1/3/2022    Ambulatory Care Coordination Note  2022  CM Risk Score: 0  Charlson 10 Year Mortality Risk Score: 2%     ACC: Eva Stearns RN    Summary Note: Pt states she is doing much better today. Pain has improved and now she is able to sleep. She is taking the Robaxin as ordered. Pt has a follow up with bariatric surgeon in 2 weeks. Pt called the NP before going to the ED and she was instructed to go to the ED. Pt is tolerating her pureed diet well, she is off work another 2 weeks, will be off a full month She is drinking fluids, no nausea or vomiting. She was instructed to not crush any of her pills . Is drinking high protein shakes, explained my role patient is agreeable to follow calls until she has returned to work and no further issues develop. Care Coordination Interventions    Program Enrollment: Complex Care  Referral from Primary Care Provider: No  Suggested Interventions and Community Resources  Disease Specific Clinic: Completed (Comment: Dr Jakob Portillo (Bariatric Surgeon))         Goals Addressed                 This Visit's Progress     Conditions and Symptoms        I will schedule office visits, as directed by my provider. I will keep my appointment or reschedule if I have to cancel. I will notify my provider of any barriers to my plan of care. I will follow my Zone Management tool to seek urgent or emergent care. I will notify my provider of any symptoms that indicate a worsening of my condition. Barriers: none  Plan for overcoming my barriers: N/A  Confidence: 10/10  Anticipated Goal Completion Date: 2022 and ongoing              Prior to Admission medications    Medication Sig Start Date End Date Taking?  Authorizing Provider   vitamin B-12 (CYANOCOBALAMIN) 1000 MCG tablet Take 1,000 mcg by mouth daily    Historical Provider, MD   methocarbamol (ROBAXIN-750) 750 MG tablet Take 1 tablet by mouth 3 times daily for 10 days 1/3/22 1/13/22  Jag Herbert MD   omeprazole (PRILOSEC) 20 MG delayed release capsule Take 1 capsule by mouth daily 12/21/21   Joselito Kamara MD   Melatonin 10 MG TABS Take by mouth    Historical Provider, MD   ALPRAZolam Jeanne Co) 0.25 MG tablet 0.25 mg nightly as needed. To fall asleep  Patient not taking: Reported on 9/24/2021 6/20/21   Historical Provider, MD   etonogestrel (Ashlee Calkin) 76 MG implant Inject into the skin once 2/23/21   Historical Provider, MD       No future appointments.           Care Transitions ED Follow Up    Care Transitions Interventions  Do you have any ongoing symptoms?: No   Did you call your PCP prior to going to the ED?: Yes - Advised to go to the ED   Do you have a copy of your discharge instructions?: Yes   Do you understand what to report and when to return?: Yes   Are you following your discharge instructions?: Yes   Do you have all of your prescriptions and are they filled?: Yes   Have you scheduled your follow up appointment?: Yes   How are you going to get to your appointment?: Car - family or friend to transport   Were you discharged with any Home Care or [facility] or do you currently have any active services?: No                Kinsey SONGN, RN- [facility]  [phone]

## 2022-01-11 ENCOUNTER — CARE COORDINATION (OUTPATIENT)
Dept: OTHER | Facility: CLINIC | Age: 42
End: 2022-01-11

## 2022-01-11 NOTE — CARE COORDINATION
Ambulatory Care Coordination Note  2022  CM Risk Score: 0  Charlson 10 Year Mortality Risk Score: 2%     ACC: Suzie Naik RN    Summary Note: [facility]) contacted the patient by telephone to follow up on progress, discuss new issues or concerns, and reinforce/ provide patient education. Verified name and  with patient as identifiers. Pt said she is still having the pain with certain movements or activity, reaching for items, yawning, or moving a certain way. Said she is sleeping better though. Is taking the Robaxin but she isn't sure how much it is really helping. She said they told her the pain may last up to a month after the surgery, one month will be next week. She has her follow up with bariatric next Tuesday  morning and to get her RTW papers She is scheduled to rtw next Wednesday. She works 7 am - 7 pm she is off next Thursday  She is tolerating her liquids and pureed diet fine she said. Reinforced/ Provided Education:  Discussed red flags and appropriate site of care based on symptoms and resources available to patient including: Specialist and [employer]. Importance and benefits of: Follow up with PCP and specialist, medication adherence, self monitoring and reporting of symptoms. Plan:  Continue biweekly  outreaches to provide telephonic support, education and resources as needed. Discuss / follow up on: Goal progress and outcome from next weeks follow up with bariatrics and tolerance of return to work. Pt verbalized understanding and is agreeable to follow up contact.            Care Coordination Interventions    Program Enrollment: Complex Care  Referral from Primary Care Provider: No  Suggested Interventions and Community Resources  Disease Specific Clinic: Completed (Comment: Dr Jason Rapp (Bariatric Surgeon))         Goals Addressed                 This Visit's Progress     Conditions and Symptoms   On track     I will schedule office visits,

## 2022-01-20 ENCOUNTER — CARE COORDINATION (OUTPATIENT)
Dept: OTHER | Facility: CLINIC | Age: 42
End: 2022-01-20

## 2022-01-20 NOTE — CARE COORDINATION
Ambulatory Care Coordination Note  1/20/2022  CM Risk Score: 0  Charlson 10 Year Mortality Risk Score: 2%     ACC: Marshall Cuellar RN    Summary Note: ACM attempted to reach patient for follow up call regarding outcome from follow up visit this Tuesday with bariatrics and return to work papers being completed . HIPAA compliant message left requesting a return phone call at patients convenience. Will continue to follow. Care Coordination Interventions    Program Enrollment: Complex Care  Referral from Primary Care Provider: No  Suggested Interventions and Community Resources  Disease Specific Clinic: Completed (Comment: Dr Dahlia Campoverde (Bariatric Surgeon))             Prior to Admission medications    Medication Sig Start Date End Date Taking? Authorizing Provider   vitamin B-12 (CYANOCOBALAMIN) 1000 MCG tablet Take 1,000 mcg by mouth daily    Historical Provider, MD   omeprazole (PRILOSEC) 20 MG delayed release capsule Take 1 capsule by mouth daily 12/21/21   Gilbret Horn MD   Melatonin 10 MG TABS Take by mouth    Historical Provider, MD   ALPRAZolam Shelda Ruma) 0.25 MG tablet 0.25 mg nightly as needed. To fall asleep  Patient not taking: Reported on 9/24/2021 6/20/21   Historical Provider, MD   etonogestrel (Leesa Cull) 76 MG implant Inject into the skin once 2/23/21   Historical Provider, MD       No future appointments.       Batsheva MIRANDA, RN- [facility]  Associate Care Manager  [phone]

## 2022-01-26 ENCOUNTER — CARE COORDINATION (OUTPATIENT)
Dept: OTHER | Facility: CLINIC | Age: 42
End: 2022-01-26

## 2022-01-26 NOTE — CARE COORDINATION
Ambulatory Care Coordination Note  1/26/2022  CM Risk Score: 0  Charlson 10 Year Mortality Risk Score: 2%     ACC: Brandon Sanders RN    Summary Note: Associate Care Manager (ACM) attempted final outreach to reach patient for follow up call regarding ED follow up for LUQ pain post gastric surgery  . HIPAA compliant message left requesting a return phone call at patients convenience. Lost to follow up letter sent via My chart so patient has my contact information if needed     No further outreach scheduled with this ACM, ACM will sign off care team at this time. Episode of Care resolved. Patient has this ACM's contact information if future needs arise. Care Coordination Interventions    Program Enrollment: Complex Care  Referral from Primary Care Provider: No  Suggested Interventions and Community Resources  Disease Specific Clinic: Completed (Comment: Dr Mariano Ku (Bariatric Surgeon))         Goals Addressed                 This Visit's Progress     COMPLETED: Conditions and Symptoms        I will schedule office visits, as directed by my provider. I will keep my appointment or reschedule if I have to cancel. I will notify my provider of any barriers to my plan of care. I will follow my Zone Management tool to seek urgent or emergent care. I will notify my provider of any symptoms that indicate a worsening of my condition. Barriers: none  Plan for overcoming my barriers: N/A  Confidence: 10/10  Anticipated Goal Completion Date: 1/4/2022 and ongoing              Prior to Admission medications    Medication Sig Start Date End Date Taking?  Authorizing Provider   vitamin B-12 (CYANOCOBALAMIN) 1000 MCG tablet Take 1,000 mcg by mouth daily    Historical Provider, MD   omeprazole (PRILOSEC) 20 MG delayed release capsule Take 1 capsule by mouth daily 12/21/21   Arturo Zhang MD   Melatonin 10 MG TABS Take by mouth    Historical Provider, MD   ALPAURORAZocj Johnston) 0.25 MG tablet 0.25 mg nightly as needed. To fall asleep  Patient not taking: Reported on 9/24/2021 6/20/21   Historical Provider, MD   etonogestrel (Hulon Sane) 76 MG implant Inject into the skin once 2/23/21   Historical Provider, MD       No future appointments.       Tabatha SONGN, RN- [facility]  Associate Care Manager  [phone]

## 2022-01-26 NOTE — LETTER
Dear [facility]:    My name is Donna Garces  , Associate Care Manager for [address], and I have been trying to reach you. The Associate Care Management (ACM) program is a free-of-charge, confidential service provided to our employees and their family members covered by the [facility]. I can help you with care transitions such as when you come home from the hospital, when help is needed to manage your disease, or when you need assistance coordinating services or appointments. As healthcare providers, we know that patients do better when they have close follow up with a primary care provider (PCP). I can help you find one that is convenient to you and covered by your insurance. I can also help you understand any after visit instructions, such as what symptoms to watch out for, or any new or changed medications. We can work together using your preferred communication -- telephone, email, MyChart. If you do not have a [employer] account, I can help you request access. Our program is designed to provide you with the opportunity to have a [facility] partner with you for your healthcare needs. Due to not being able to reach you, I am closing out the current program, but will remain available to you should you have any questions. Please contact me at [phone]        If you have any questions or concerns, please don't hesitate to call.     Sincerely,        Gallito Quiroga RN

## 2022-02-10 NOTE — DISCHARGE SUMMARY
Discharge Summary      Patient:    Blanka Saunders Date:   12/20/2021 11:12 AM    Discharge Date:   12/21/2021    Admitting Physician:   Marcello Mcgill DO     Discharge Physician:   same    Admitting Diagnosis:  Morbid obesity    Discharge Diagnosis:   same     History reviewed. No pertinent past medical history. Indication for Admission:   Patient presented to the [facility]. to undergo laparoscopic sleeve gastrectomy for morbid obesity. Hospital Course:   Patient presented to the [facility] for the above-stated procedure. Patient recovered well in the postoperative period. She denied any nausea or vomiting and her pain was well controlled. Was able to get out of bed and ambulate. On POD 1, the patient continued to improve. She was advanced to a bariatric clear liquid diet and was tolerating well without any nausea or vomiting. Patient was discharged later that hospital day in stable condition.     Discharge physical exam:  General appearance: alert, appears stated age and cooperative  Eyes: No scleral icterus, EOM grossly intact  Neck: trachea midline, no JVD, no lymphadenopathy, neck supple  Chest/Lungs: CTAB, normal effort with no accessory muscle use on RA  Cardiovascular: RRR  Abdomen: Soft, appropriately-tender, incisions c/d/i and well approximated with Dermabond  Skin: warm and dry, no rashes  Extremities: no edema, no cyanosis  Genitourinary: Grossly normal  Neuro: A&Ox3, no focal deficits, sensation intact    Disposition:    Home    Condition at discharge:  Stable    Discharge Instructions:  See separate form    Patient Instructions:      Medication List      START taking these medications    omeprazole 20 MG delayed release capsule  Commonly known as: PriLOSEC  Take 1 capsule by mouth daily        CONTINUE taking these medications    ALPRAZolam 0.25 MG tablet  Commonly known as: XANAX     etonogestrel 68 MG implant  Commonly known as: NEXPLANON     Melatonin 10 MG Tabs           Where to Get Your Medications      These medications were sent to [facility] [address], [address] [facility] [address]  [address] [place] [address]    Phone: [phone]   · omeprazole 20 MG delayed release capsule         Karena Gonzalez DO  02/09/22  10:39 PM

## 2022-10-27 ENCOUNTER — OFFICE VISIT (OUTPATIENT)
Dept: PRIMARY CARE CLINIC | Age: 42
End: 2022-10-27
Payer: COMMERCIAL

## 2022-10-27 VITALS
HEIGHT: 67 IN | DIASTOLIC BLOOD PRESSURE: 88 MMHG | SYSTOLIC BLOOD PRESSURE: 120 MMHG | WEIGHT: 201.2 LBS | BODY MASS INDEX: 31.58 KG/M2 | HEART RATE: 54 BPM | OXYGEN SATURATION: 97 % | RESPIRATION RATE: 14 BRPM | TEMPERATURE: 97.4 F

## 2022-10-27 DIAGNOSIS — L02.411 ABSCESS OF RIGHT AXILLA: Primary | ICD-10-CM

## 2022-10-27 PROCEDURE — G8484 FLU IMMUNIZE NO ADMIN: HCPCS | Performed by: STUDENT IN AN ORGANIZED HEALTH CARE EDUCATION/TRAINING PROGRAM

## 2022-10-27 PROCEDURE — 1036F TOBACCO NON-USER: CPT | Performed by: STUDENT IN AN ORGANIZED HEALTH CARE EDUCATION/TRAINING PROGRAM

## 2022-10-27 PROCEDURE — G8417 CALC BMI ABV UP PARAM F/U: HCPCS | Performed by: STUDENT IN AN ORGANIZED HEALTH CARE EDUCATION/TRAINING PROGRAM

## 2022-10-27 PROCEDURE — G8427 DOCREV CUR MEDS BY ELIG CLIN: HCPCS | Performed by: STUDENT IN AN ORGANIZED HEALTH CARE EDUCATION/TRAINING PROGRAM

## 2022-10-27 PROCEDURE — 10060 I&D ABSCESS SIMPLE/SINGLE: CPT | Performed by: STUDENT IN AN ORGANIZED HEALTH CARE EDUCATION/TRAINING PROGRAM

## 2022-10-27 PROCEDURE — 99213 OFFICE O/P EST LOW 20 MIN: CPT | Performed by: STUDENT IN AN ORGANIZED HEALTH CARE EDUCATION/TRAINING PROGRAM

## 2022-10-27 RX ORDER — SULFAMETHOXAZOLE AND TRIMETHOPRIM 800; 160 MG/1; MG/1
1 TABLET ORAL 2 TIMES DAILY
Qty: 14 TABLET | Refills: 0 | Status: SHIPPED | OUTPATIENT
Start: 2022-10-27 | End: 2022-11-03

## 2022-10-27 RX ORDER — LIDOCAINE HYDROCHLORIDE 10 MG/ML
2 INJECTION, SOLUTION EPIDURAL; INFILTRATION; INTRACAUDAL; PERINEURAL ONCE
Status: COMPLETED | OUTPATIENT
Start: 2022-10-27 | End: 2022-10-27

## 2022-10-27 RX ADMIN — LIDOCAINE HYDROCHLORIDE 2 ML: 10 INJECTION, SOLUTION EPIDURAL; INFILTRATION; INTRACAUDAL; PERINEURAL at 11:08

## 2022-10-27 SDOH — ECONOMIC STABILITY: FOOD INSECURITY: WITHIN THE PAST 12 MONTHS, YOU WORRIED THAT YOUR FOOD WOULD RUN OUT BEFORE YOU GOT MONEY TO BUY MORE.: NEVER TRUE

## 2022-10-27 SDOH — ECONOMIC STABILITY: FOOD INSECURITY: WITHIN THE PAST 12 MONTHS, THE FOOD YOU BOUGHT JUST DIDN'T LAST AND YOU DIDN'T HAVE MONEY TO GET MORE.: NEVER TRUE

## 2022-10-27 ASSESSMENT — PATIENT HEALTH QUESTIONNAIRE - PHQ9
SUM OF ALL RESPONSES TO PHQ QUESTIONS 1-9: 0
1. LITTLE INTEREST OR PLEASURE IN DOING THINGS: 0
SUM OF ALL RESPONSES TO PHQ QUESTIONS 1-9: 0
SUM OF ALL RESPONSES TO PHQ9 QUESTIONS 1 & 2: 0
2. FEELING DOWN, DEPRESSED OR HOPELESS: 0

## 2022-10-27 ASSESSMENT — SOCIAL DETERMINANTS OF HEALTH (SDOH): HOW HARD IS IT FOR YOU TO PAY FOR THE VERY BASICS LIKE FOOD, HOUSING, MEDICAL CARE, AND HEATING?: NOT HARD AT ALL

## 2022-10-27 NOTE — PROGRESS NOTES
Wendy Barajas (:  1980) is a 43 y.o. female,Established patient, here for evaluation of the following chief complaint(s):  Arm Pain (Right Underarm bump that is sore, swollen, and red.)         ASSESSMENT/PLAN:  1. Abscess of right axilla  -     sulfamethoxazole-trimethoprim (BACTRIM DS;SEPTRA DS) 800-160 MG per tablet; Take 1 tablet by mouth 2 times daily for 7 days, Disp-14 tablet, R-0Normal  -     10273 - AL DRAIN SKIN ABSCESS SIMPLE  -     lidocaine PF 1 % injection 2 mL; 2 mL, IntraDERmal, ONCE, 1 dose, On Thu 10/27/22 at 1130    Return if symptoms worsen or fail to improve. Subjective   SUBJECTIVE/OBJECTIVE:  HPI    Right axillary superficial lump and started February and over last couple of weeks has gotten bigger, no fevers. Breast reduction  in     No fam history of breast cancer but MGM had uterine cancer     Review of Systems   All other systems reviewed and are negative. Objective   Physical Exam  Vitals reviewed. Constitutional:       General: She is not in acute distress. Appearance: Normal appearance. She is not ill-appearing. HENT:      Head: Normocephalic and atraumatic. Right Ear: External ear normal.      Left Ear: External ear normal.      Nose: Nose normal.      Mouth/Throat:      Mouth: Mucous membranes are moist.   Eyes:      General: No scleral icterus. Right eye: No discharge. Left eye: No discharge. Extraocular Movements: Extraocular movements intact. Conjunctiva/sclera: Conjunctivae normal.   Cardiovascular:      Heart sounds: No murmur heard. No friction rub. No gallop. Pulmonary:      Effort: No respiratory distress. Breath sounds: No wheezing, rhonchi or rales. Musculoskeletal:      Cervical back: Neck supple. Skin:     General: Skin is warm. Coloration: Skin is not jaundiced. Findings: No lesion or rash.       Comments: Right axilla  Fluctuant tender abscess with central pustule Neurological:      General: No focal deficit present. Mental Status: She is alert. Cranial Nerves: No cranial nerve deficit. Coordination: Coordination normal.   Psychiatric:         Mood and Affect: Mood normal.     Incision and Drainage Procedure Note    PROCEDURE:   A timeout protocol was performed prior to initiating the procedure. The area was prepared and draped in the usual, sterile manner with alcohol. The site was anesthetized with 1% lidocaine . A linear incision along the local skin lines was made and the purulent material expressed. The abcess was NOT explored Bleeding was minimal.     Dressing: 4x4 sterile gauze and paper tape     Followup: The patient tolerated the procedure well without complications. Standard post-procedure care is explained and return precautions are given. An electronic signature was used to authenticate this note.     --Keyanna Dominguez MD

## 2022-11-07 ENCOUNTER — OFFICE VISIT (OUTPATIENT)
Dept: GYNECOLOGY | Age: 42
End: 2022-11-07
Payer: COMMERCIAL

## 2022-11-07 VITALS
BODY MASS INDEX: 32.4 KG/M2 | SYSTOLIC BLOOD PRESSURE: 121 MMHG | HEART RATE: 56 BPM | DIASTOLIC BLOOD PRESSURE: 72 MMHG | WEIGHT: 203.8 LBS | OXYGEN SATURATION: 98 % | TEMPERATURE: 97.3 F

## 2022-11-07 DIAGNOSIS — Z01.419 WELL WOMAN EXAM WITH ROUTINE GYNECOLOGICAL EXAM: Primary | ICD-10-CM

## 2022-11-07 DIAGNOSIS — Z12.31 BREAST CANCER SCREENING BY MAMMOGRAM: ICD-10-CM

## 2022-11-07 PROCEDURE — G8484 FLU IMMUNIZE NO ADMIN: HCPCS | Performed by: OBSTETRICS & GYNECOLOGY

## 2022-11-07 PROCEDURE — 99386 PREV VISIT NEW AGE 40-64: CPT | Performed by: OBSTETRICS & GYNECOLOGY

## 2022-11-07 NOTE — PROGRESS NOTES
SUBJECTIVE:  Miguel A Nelson is an 43y.o. year old woman who presents for annual gyn exam.    She is referred by Dr. Carine Pizarro. The patient recently had a right axillary abscess drained and treated with antibiotics by Dr. Carine Pizarro. The patient reports its been a while since her last Pap smear. She would like to get this done. She denies any history of abnormal Pap smears. She has a history of breast reduction and reports a strong displeasure for mammography citing discomfort as well as frequently needing further evaluation including biopsy due to scar tissue. She is encouraged on getting annual screening mammography performed in order is placed. She is instructed on making an appointment. She is about 1 year out from a gastric sleeve. The patient has a Nexplanon device in place. She reports that it is due for removal in January. She would like to get another one. Paperwork for ordering is placed. REVIEW OF SYSTEMS:  No complaints of symptoms involving:  Constitutional: there has been no unanticipated weight loss. There's been no change in activity level. Negative for fever, chills. Eyes: No visual changes, double vision, or scotomata. No scleral icterus. HENT: No Headaches, hearing loss or vertigo. No sore throat, ear pain or nasal congestion  Respiratory: no cough or wheezing, no sputum production, no hemoptysis. Gastrointestinal: No abdominal pain, appetite loss, blood in stools. No change in bowel habits. Genitourinary: No dysuria, trouble voiding, or hematuria. No change in bladder habits. Musculoskeletal:  No gait disturbance,no weakness or joint complaints. Skin: No rash or pruritis. Neurological: No headache, vision changes, change in muscle strength, numbness or tingling. No change in gait, balance, coordination. Psychiatric: No anxiety, or depression. No change in mood or behavior. Endocrine: No temperature intolerance. No excessive thirst, fluid intake, or urination.  No tremor. Hematologic/Lymphatic: No abnormal bruising or bleeding, blood clots or swollen lymph nodes. Patient Active Problem List   Diagnosis    Hyperlipidemia    Metabolic syndrome    Class 3 severe obesity in adult [facility])    Morbid obesity (HCC)     Current Outpatient Medications   Medication Sig Dispense Refill    Multiple Vitamins-Minerals (WOMENS MULTIVITAMIN PLUS PO) Take by mouth daily Multivitamin gel cap.      calcium citrate-vitamin D (CITRICAL + D) 315-250 MG-UNIT TABS per tablet Take 1 tablet by mouth 2 times daily (with meals) Calcium citrate chocolate. vitamin B-12 (CYANOCOBALAMIN) 1000 MCG tablet Take 1,000 mcg by mouth daily      omeprazole (PRILOSEC) 20 MG delayed release capsule Take 1 capsule by mouth daily 30 capsule 3    Melatonin 10 MG TABS Take by mouth      ALPRAZolam (XANAX) 0.25 MG tablet 0.25 mg nightly as needed. To fall asleep (Patient not taking: No sig reported)      etonogestrel ([place]) 68 MG implant Inject into the skin once       No current facility-administered medications for this visit. No past medical history on file. Past Surgical History:   Procedure Laterality Date    BREAST SURGERY      SLEEVE GASTRECTOMY N/A 12/20/2021    LAPAROSCOPIC SLEEVE GASTRECTOMY, repair hiatal hernia performed by Angel Luis Abbott DO at [address] History     Tobacco Use    Smoking status: Never    Smokeless tobacco: Never   Substance Use Topics    Alcohol use: Never     OB History    No obstetric history on file.        Family History   Problem Relation Age of Onset    Thyroid Disease Mother     Other Mother     Cancer Maternal Grandmother     Heart Failure Maternal Grandfather     Diabetes Maternal Grandfather     Diabetes Paternal Grandfather     Heart Failure Paternal Grandfather        OBJECTIVE:  /72   Pulse 56   Temp 97.3 °F (36.3 °C)   Wt 203 lb 12.8 oz (92.4 kg)   LMP 10/27/2022   SpO2 98%   BMI 32.40 kg/m²   Body mass index is 32.4 kg/m². General appearance: alert,calm,pleasant, no acute distress, non-toxic  Skin:  no lesions,no rashes  Neck: no thyromegaly,no adenopathy,no masses  Abdominal: Abdomen soft, non-tender. No rebound or guarding. No masses, organomegaly  Breasts: Inspection negative. Scarring from prior reduction. No skin changes such as dipples, edema, or retractions. No nipple discharge or bleeding. No mass palpated. Non tender. No supraclavicular, infraclavicular, or axillary lymphadenopathy  Genitourinary:  Vulva:  no external lesions,normal hair distribution,no inguinal adenopathy  Vagina:  moist,pink,well rugated,no discharge  Bladder without mass, nontender. Urethra, and Urethral meatus grossly normal without mass and nontender  Cervix:  smooth,pink,no lesions. Uterus:  normal size, shape and consistency,mobile,nontender  Adnexa:  no masses,no tenderness  Rectum/anus:  no external hemorrhoids,no lesions  History and Examination chaperoned by Medical Assistant    ASSESSMENT AND PLAN:   Diagnosis Orders   1. Well woman exam with routine gynecological exam  PAP SMEAR          SBE monthly and return annually or prn  Explained current pap smear guidelines  Patient counseling:  SBE demonstrated. Mammography. Nexplanon use removal and insertion risks all reviewed and acceptable to the patient.

## 2022-11-07 NOTE — LETTER
[address] [facility]ne[facility]  [place] [id number] [place] [address]  Phone: [phone]  Fax: [phone]    Fadia Carvalho MD    2022     Sohail Bell MD  [facility] [address] [address] [facility] [address]    Patient: Cullen Das   MR Number: [id number]   YOB: 1980   Date of Visit: 2022       Dear Sohail Bell: Thank you for referring Greg Cervantes to me for evaluation/treatment. Below are the relevant portions of my assessment and plan of care. Ricardo Burr was seen today for well woman gynecologic care. Pap smear was collected and sent. She is instructed on mammography. She has a Nexplanon in place due to  in January. Order is placed for new Nexplanon per her request.     If you have questions, please do not hesitate to call me. I look forward to following Ricardo Burr along with you.     Sincerely,      Fadia Carvalho MD

## 2022-11-10 LAB
HPV COMMENT: NORMAL
HPV TYPE 16: NOT DETECTED
HPV TYPE 18: NOT DETECTED
HPVOH (OTHER TYPES): NOT DETECTED

## 2022-12-06 ENCOUNTER — HOSPITAL ENCOUNTER (OUTPATIENT)
Dept: WOMENS IMAGING | Age: 42
Discharge: HOME | End: 2022-12-06
Payer: COMMERCIAL

## 2022-12-06 DIAGNOSIS — Z12.31 BREAST CANCER SCREENING BY MAMMOGRAM: ICD-10-CM

## 2022-12-06 PROCEDURE — 77067 SCR MAMMO BI INCL CAD: CPT

## 2023-02-10 ENCOUNTER — OFFICE VISIT (OUTPATIENT)
Dept: GYNECOLOGY | Age: 43
End: 2023-02-10

## 2023-02-10 VITALS — OXYGEN SATURATION: 100 % | SYSTOLIC BLOOD PRESSURE: 134 MMHG | DIASTOLIC BLOOD PRESSURE: 82 MMHG | HEART RATE: 86 BPM

## 2023-02-10 DIAGNOSIS — Z97.5 PRESENCE OF SUBCUTANEOUS CONTRACEPTIVE IMPLANT: Primary | ICD-10-CM

## 2023-02-10 NOTE — LETTER
[address] [place] [address]  Phone: [phone]  Fax: [phone]    Di Garner MD    February 10, 2023     Gaviota Rose MD  [facility] [address] [address] [facility] [address]    Patient: Lala Russo   MR Number: [id number]   YOB: 1980   Date of Visit: 2/10/2023       Dear Gaviota Rose: Thank you for referring Shaneka Nunez to me for evaluation/treatment. Below are the relevant portions of my assessment and plan of care. Arnaldo Borges was seen today for removal and reinsertion of Nexplanon device. If you have questions, please do not hesitate to call me. I look forward to following Arnaldo Borges along with you.     Sincerely,      Di Garner MD

## 2023-02-10 NOTE — PROGRESS NOTES
Daisha Flowers is a 43 y.o. female here today for birth control. Birth control options were reviewed with her. She has been counseled regarding contraception options. She is interested in the Nexplanon device. She has previously been consented and has reviewed counseling materials and given time to review the information and ask questions. I reviewed Nexplanon with her again today prior to insertion along with the consent. The patient is able to voice understanding that Nexplanon is used for birth control to keep her from getting pregnant. She understands that no contraceptive method is 100% effective including Nexplanon. She understands that this is a progesterone medication insert. Daisha Flowers is currently menstruating and her pregnancy test is negative. Her previous contraceptive method was Nexplanon. The patient knows to have Nexplanon removed after three years and that it could be removed sooner if so desired. She is informed of the risks of the insertion including bleeding, infection, bruising and scarring. She is also aware of the same related to removal.  She was also made aware of the possibility of difficulty with removal including the possible need for surgical removal if necessary. I spent a fair amount of time discussing alterations in menstrual bleeding while using Nexplanon. She was told that her bleeding may be quite irregular and lighter or heavier or bleeding may completely stop. She was informed that if she felt that she became pregnant that she would need to seek immediate medical attention and get a pregnancy test.  She was told that the abnormal bleeding pattern would be present during the entire three years of therapy. Daisha Flowers was told that when she seeks health care from any provider that she should notify her provider that she is using Nexplanon and that it is a medication and as such may interact with other medications.   She understands that Nexplanon does not protect her from sexually transmitted infections or human immunodeficiency virus (HIV). She also is aware that she will need to continue to have regular gynecology preventative health care maintenance including Pap smears and evaluation for sexually transmitted infections as needed as well as regular health maintenance from her primary care physician. After considering her options and understanding the above, the patient wishes to proceed. Informed consent was given and the patient signed the patient consent form included in the packaging with the [address]. Anjum Trujillo is a 43 y.o. female here today wanting to proceed with Nexplanon insertion. Patient Active Problem List   Diagnosis    Hyperlipidemia    Metabolic syndrome    Class 3 severe obesity in adult [facility])    Morbid obesity ([address]     OB History          1    Para   1    Term   1            AB        Living             SAB        IAB        Ectopic        Molar        Multiple        Live Births                  No past medical history on file. Past Surgical History:   Procedure Laterality Date    BREAST BIOPSY      BREAST SURGERY      SLEEVE GASTRECTOMY N/A 2021    LAPAROSCOPIC SLEEVE GASTRECTOMY, repair hiatal hernia performed by Hernán Uribe DO at [place] [address]       Allergy: Other  Current Outpatient Medications   Medication Sig Dispense Refill    Multiple Vitamins-Minerals (WOMENS MULTIVITAMIN PLUS PO) Take by mouth daily Multivitamin gel cap.      calcium citrate-vitamin D (CITRICAL + D) 315-250 MG-UNIT TABS per tablet Take 1 tablet by mouth 2 times daily (with meals) Calcium citrate chocolate.       vitamin B-12 (CYANOCOBALAMIN) 1000 MCG tablet Take 1,000 mcg by mouth daily      omeprazole (PRILOSEC) 20 MG delayed release capsule Take 1 capsule by mouth daily 30 capsule 3    Melatonin 10 MG TABS Take by mouth      etonogestrel (NEXPLANON) 68 MG implant Inject into the skin once       No current facility-administered medications for this visit. Social History     Tobacco Use    Smoking status: Former     Types: Cigarettes    Smokeless tobacco: Never   Substance Use Topics    Alcohol use: Never     Family History   Problem Relation Age of Onset    Thyroid Disease Mother     Other Mother     Uterine Cancer Maternal Grandmother     Cancer Maternal Grandmother     Heart Failure Maternal Grandfather     Diabetes Maternal Grandfather     Diabetes Paternal Grandfather     Heart Failure Paternal Grandfather        /82 (Site: Right Upper Arm, Position: Sitting, Cuff Size: Medium Adult)   Pulse 86   SpO2 100%   General appearance: alert, no acute distress, non-toxic, normal respiratory effort  HEENT: Sclera are clear and non icteric. MOOD and AFFECT: Appropriate,calm. JUDGEMENT and INSIGHT: Intact. Skin:  no lesions,no rashes  Left upper extremity with no gross deformity and grossly normal range of motion. Diagnosis Orders   1. Presence of subcutaneous contraceptive implant  [place] RMVL W/RINSJ NON-BIODEGRADABLE DRUG DLVR IMPLT           Procedure note:    Nexplanon device removed in the usual fashion after Betadine prep and lidocaine with epinephrine used for local anesthetic. The device is removed without complication. Nexplanon is inserted in the patient's left arm in the usual fashion after Betadine prep and Xylocaine used for local anesthetic. The implant was present in the needle before insertion. The applicator was held upright when the needle cap was removed right before insertion. The grooved tip of the obturator was visible in the needle after insertion. Implant placement was confirmed by palpation by myself and Marzena after insertion. Steri-Strip is applied and the wound is dressed. The patient tolerated the procedure well and no complications were experienced. Bandaid was applied. Postprocedure instructions were reviewed.       The patient will continue to follow-up annually and for p.r.n. reasons. Warnings and instructions were given to Jocelyne Iglesias. Her questions were answered. Hemalatha Loera voices understanding.

## 2023-03-24 ENCOUNTER — TELEPHONE (OUTPATIENT)
Dept: PRIMARY CARE CLINIC | Age: 43
End: 2023-03-24

## 2023-05-18 ENCOUNTER — OFFICE VISIT (OUTPATIENT)
Dept: PRIMARY CARE CLINIC | Age: 43
End: 2023-05-18
Payer: COMMERCIAL

## 2023-05-18 VITALS
WEIGHT: 197 LBS | HEART RATE: 66 BPM | HEIGHT: 67 IN | BODY MASS INDEX: 30.92 KG/M2 | TEMPERATURE: 98.4 F | OXYGEN SATURATION: 97 % | SYSTOLIC BLOOD PRESSURE: 120 MMHG | DIASTOLIC BLOOD PRESSURE: 82 MMHG

## 2023-05-18 DIAGNOSIS — H00.015 HORDEOLUM EXTERNUM OF LEFT LOWER EYELID: Primary | ICD-10-CM

## 2023-05-18 PROCEDURE — G8427 DOCREV CUR MEDS BY ELIG CLIN: HCPCS | Performed by: NURSE PRACTITIONER

## 2023-05-18 PROCEDURE — 99213 OFFICE O/P EST LOW 20 MIN: CPT | Performed by: NURSE PRACTITIONER

## 2023-05-18 PROCEDURE — G8417 CALC BMI ABV UP PARAM F/U: HCPCS | Performed by: NURSE PRACTITIONER

## 2023-05-18 PROCEDURE — 1036F TOBACCO NON-USER: CPT | Performed by: NURSE PRACTITIONER

## 2023-05-18 RX ORDER — BACITRACIN ZINC AND POLYMYXIN B SULFATE 500; 10000 [USP'U]/G; [USP'U]/G
OINTMENT OPHTHALMIC 2 TIMES DAILY
Qty: 3.5 G | Refills: 0 | Status: SHIPPED | OUTPATIENT
Start: 2023-05-18 | End: 2023-05-28

## 2023-05-18 SDOH — ECONOMIC STABILITY: HOUSING INSECURITY
IN THE LAST 12 MONTHS, WAS THERE A TIME WHEN YOU DID NOT HAVE A STEADY PLACE TO SLEEP OR SLEPT IN A SHELTER (INCLUDING NOW)?: NO

## 2023-05-18 SDOH — ECONOMIC STABILITY: FOOD INSECURITY: WITHIN THE PAST 12 MONTHS, THE FOOD YOU BOUGHT JUST DIDN'T LAST AND YOU DIDN'T HAVE MONEY TO GET MORE.: NEVER TRUE

## 2023-05-18 SDOH — ECONOMIC STABILITY: FOOD INSECURITY: WITHIN THE PAST 12 MONTHS, YOU WORRIED THAT YOUR FOOD WOULD RUN OUT BEFORE YOU GOT MONEY TO BUY MORE.: NEVER TRUE

## 2023-05-18 SDOH — ECONOMIC STABILITY: INCOME INSECURITY: HOW HARD IS IT FOR YOU TO PAY FOR THE VERY BASICS LIKE FOOD, HOUSING, MEDICAL CARE, AND HEATING?: NOT HARD AT ALL

## 2023-05-18 ASSESSMENT — ENCOUNTER SYMPTOMS
PHOTOPHOBIA: 0
EYE DISCHARGE: 1
EYE ITCHING: 0
EYE REDNESS: 0
EYE PAIN: 1
RHINORRHEA: 0

## 2023-05-18 ASSESSMENT — PATIENT HEALTH QUESTIONNAIRE - PHQ9
SUM OF ALL RESPONSES TO PHQ9 QUESTIONS 1 & 2: 0
SUM OF ALL RESPONSES TO PHQ QUESTIONS 1-9: 0
2. FEELING DOWN, DEPRESSED OR HOPELESS: 0
1. LITTLE INTEREST OR PLEASURE IN DOING THINGS: 0
SUM OF ALL RESPONSES TO PHQ QUESTIONS 1-9: 0

## 2023-05-18 NOTE — PROGRESS NOTES
ENCOUNTER DATE: 5/18/2023     NAME: Cullen Das   AGE: 37 y.o. GENDER: female   YOB: 1980    Chief Complaint   Patient presents with    Eye Problem     Something  in  left  eye   swollen       ASSESSMENT/PLAN:  1. Hordeolum externum of left lower eyelid  Discussed treatments. Warm compresses. Start on antibiotic ointment. Avoid makeup. Handouts given. If worsens, may need to follow up with eye specialist.   - bacitracin-polymyxin b (POLYSPORIN) 500-90122 UNIT/GM ophthalmic ointment; Place into the left eye 2 times daily for 10 days  Dispense: 3.5 g; Refill: 0      Return if symptoms worsen or fail to improve. HPI:   Patient is here for a problem visit    Complains of pimple like lesion on lower left eyelid since yesterday. Is sore to touch. Had more crusty stuff than normal this am.   No itching or burning. No vision changes. Doesn't wear makeup    ROS:  Review of Systems   HENT:  Negative for congestion, postnasal drip and rhinorrhea. Eyes:  Positive for pain and discharge. Negative for photophobia, redness, itching and visual disturbance. VITALS:  /82   Pulse 66   Temp 98.4 °F (36.9 °C) (Oral)   Ht 5' 7\" (1.702 m)   Wt 197 lb (89.4 kg)   LMP 05/16/2023   SpO2 97%   BMI 30.85 kg/m²      PE:  Physical Exam  Constitutional:       Appearance: Normal appearance. She is normal weight. Eyes:      General:         Left eye: Hordeolum present. No discharge. Conjunctiva/sclera:      Left eye: Left conjunctiva is not injected. No exudate or hemorrhage. Cardiovascular:      Rate and Rhythm: Normal rate. Pulmonary:      Effort: Pulmonary effort is normal. No respiratory distress. Neurological:      Mental Status: She is alert.    Psychiatric:         Mood and Affect: Mood normal.         Behavior: Behavior normal.        Patient Active Problem List   Diagnosis    Hyperlipidemia    Metabolic syndrome    Class 3 severe obesity in [facility])    Morbid

## 2023-07-12 ENCOUNTER — OFFICE VISIT (OUTPATIENT)
Dept: PRIMARY CARE CLINIC | Age: 43
End: 2023-07-12
Payer: COMMERCIAL

## 2023-07-12 ENCOUNTER — TELEPHONE (OUTPATIENT)
Dept: PRIMARY CARE CLINIC | Age: 43
End: 2023-07-12

## 2023-07-12 VITALS
HEIGHT: 67 IN | HEART RATE: 78 BPM | OXYGEN SATURATION: 99 % | BODY MASS INDEX: 31.08 KG/M2 | WEIGHT: 198 LBS | RESPIRATION RATE: 16 BRPM | SYSTOLIC BLOOD PRESSURE: 128 MMHG | DIASTOLIC BLOOD PRESSURE: 82 MMHG | TEMPERATURE: 98 F

## 2023-07-12 DIAGNOSIS — L24.9 IRRITANT CONTACT DERMATITIS, UNSPECIFIED TRIGGER: Primary | ICD-10-CM

## 2023-07-12 PROCEDURE — 99213 OFFICE O/P EST LOW 20 MIN: CPT | Performed by: STUDENT IN AN ORGANIZED HEALTH CARE EDUCATION/TRAINING PROGRAM

## 2023-07-12 PROCEDURE — G8417 CALC BMI ABV UP PARAM F/U: HCPCS | Performed by: STUDENT IN AN ORGANIZED HEALTH CARE EDUCATION/TRAINING PROGRAM

## 2023-07-12 PROCEDURE — G8427 DOCREV CUR MEDS BY ELIG CLIN: HCPCS | Performed by: STUDENT IN AN ORGANIZED HEALTH CARE EDUCATION/TRAINING PROGRAM

## 2023-07-12 PROCEDURE — 1036F TOBACCO NON-USER: CPT | Performed by: STUDENT IN AN ORGANIZED HEALTH CARE EDUCATION/TRAINING PROGRAM

## 2023-07-12 RX ORDER — PREDNISONE 10 MG/1
TABLET ORAL
Qty: 30 TABLET | Refills: 0 | Status: SHIPPED | OUTPATIENT
Start: 2023-07-12 | End: 2023-07-24

## 2023-08-30 LAB
CHOLEST SERPL-MCNC: 221 MG/DL (ref 0–199)
GLUCOSE SERPL-MCNC: 79 MG/DL (ref 70–99)
HDLC SERPL-MCNC: 63 MG/DL (ref 40–60)
LDLC SERPL CALC-MCNC: 138 MG/DL
TRIGL SERPL-MCNC: 102 MG/DL (ref 0–150)

## 2024-05-03 ENCOUNTER — OFFICE VISIT (OUTPATIENT)
Dept: PRIMARY CARE CLINIC | Age: 44
End: 2024-05-03

## 2024-05-03 VITALS
DIASTOLIC BLOOD PRESSURE: 78 MMHG | WEIGHT: 205 LBS | TEMPERATURE: 97.1 F | SYSTOLIC BLOOD PRESSURE: 124 MMHG | OXYGEN SATURATION: 99 % | HEART RATE: 67 BPM | BODY MASS INDEX: 32.59 KG/M2

## 2024-05-03 DIAGNOSIS — D64.9 ANEMIA, UNSPECIFIED TYPE: ICD-10-CM

## 2024-05-03 DIAGNOSIS — Z90.49 S/P CHOLECYSTECTOMY: ICD-10-CM

## 2024-05-03 DIAGNOSIS — K81.0 ACUTE CHOLECYSTITIS: ICD-10-CM

## 2024-05-03 DIAGNOSIS — Z09 HOSPITAL DISCHARGE FOLLOW-UP: Primary | ICD-10-CM

## 2024-05-03 ASSESSMENT — PATIENT HEALTH QUESTIONNAIRE - PHQ9
1. LITTLE INTEREST OR PLEASURE IN DOING THINGS: NOT AT ALL
10. IF YOU CHECKED OFF ANY PROBLEMS, HOW DIFFICULT HAVE THESE PROBLEMS MADE IT FOR YOU TO DO YOUR WORK, TAKE CARE OF THINGS AT HOME, OR GET ALONG WITH OTHER PEOPLE: NOT DIFFICULT AT ALL
7. TROUBLE CONCENTRATING ON THINGS, SUCH AS READING THE NEWSPAPER OR WATCHING TELEVISION: NOT AT ALL
SUM OF ALL RESPONSES TO PHQ QUESTIONS 1-9: 0
9. THOUGHTS THAT YOU WOULD BE BETTER OFF DEAD, OR OF HURTING YOURSELF: NOT AT ALL
4. FEELING TIRED OR HAVING LITTLE ENERGY: NOT AT ALL
SUM OF ALL RESPONSES TO PHQ QUESTIONS 1-9: 0
SUM OF ALL RESPONSES TO PHQ9 QUESTIONS 1 & 2: 0
SUM OF ALL RESPONSES TO PHQ QUESTIONS 1-9: 0
5. POOR APPETITE OR OVEREATING: NOT AT ALL
2. FEELING DOWN, DEPRESSED OR HOPELESS: NOT AT ALL
6. FEELING BAD ABOUT YOURSELF - OR THAT YOU ARE A FAILURE OR HAVE LET YOURSELF OR YOUR FAMILY DOWN: NOT AT ALL
SUM OF ALL RESPONSES TO PHQ QUESTIONS 1-9: 0
3. TROUBLE FALLING OR STAYING ASLEEP: NOT AT ALL
8. MOVING OR SPEAKING SO SLOWLY THAT OTHER PEOPLE COULD HAVE NOTICED. OR THE OPPOSITE, BEING SO FIGETY OR RESTLESS THAT YOU HAVE BEEN MOVING AROUND A LOT MORE THAN USUAL: NOT AT ALL

## 2024-05-03 NOTE — PROGRESS NOTES
Post-Discharge Transitional Care Follow Up    Cecelia Luna   YOB: 1980    Date of Office Visit:  5/3/2024  Date of Hospital Admission: 4/19/24  Date of Hospital Discharge: 4/21/24  Facility: [facility]     Care management risk score Rising risk (score 2-5) and Complex Care (Scores >=6): No Risk Score On File     Non face to face  following discharge, date last encounter closed (first attempt may have been earlier): *No documented post hospital discharge outreach found in the last 14 days     Call initiated 2 business days of discharge: *No response recorded in the last 14 days    ASSESSMENT/PLAN:   Hospital discharge follow-up  -     MS DISCHARGE MEDS RECONCILED W/ CURRENT OUTPATIENT MED LIST  -     Juan Rivas MD, General Surgery, [place]  Acute cholecystitis  resolved  S/P cholecystectomy  Doing well, still sore but able to tolerate PO and normal BM's  Will need post op appt with surgeon  -     Juan Rivas MD, General Surgery, [place]  Anemia, unspecified type  -     CBC with Auto Differential; Future    FMLA paperwork filled out and given back to patient before she left    Medical Decision Making: moderate complexity  Return if symptoms worsen or fail to improve.           Subjective:   HPI    Inpatient course: Discharge summary reviewed- see chart.    Patient was admitted to [facility] for epigastric pain and found to have acute cholecystitis received a laparoscopic cholecystectomy.    Cholesterol was little elevated total cholesterol 209 , was slightly anemic likely secondary to blood draws and hemodilution,  Interval history/Current status: still sore but improved from before  Avoiding fatty greasy foods, loose stools , no fevers, no other issues    Patient Active Problem List   Diagnosis    Hyperlipidemia    Metabolic syndrome    Class 3 severe obesity in adult (HCC)    Morbid obesity (HCC)

## 2024-05-05 ENCOUNTER — HOSPITAL ENCOUNTER (EMERGENCY)
Age: 44
Discharge: HOME | End: 2024-05-06
Attending: EMERGENCY MEDICINE
Payer: COMMERCIAL

## 2024-05-05 ENCOUNTER — APPOINTMENT (OUTPATIENT)
Age: 44
End: 2024-05-05
Payer: COMMERCIAL

## 2024-05-05 DIAGNOSIS — R10.9 ABDOMINAL PAIN, UNSPECIFIED ABDOMINAL LOCATION: Primary | ICD-10-CM

## 2024-05-05 LAB
ALBUMIN SERPL-MCNC: 4.3 G/DL (ref 3.4–5)
ALBUMIN/GLOB SERPL: 1.5 {RATIO}
ALP SERPL-CCNC: 89 U/L (ref 40–129)
ALT SERPL-CCNC: 8 U/L (ref 10–40)
ANION GAP SERPL CALCULATED.3IONS-SCNC: 11 MMOL/L (ref 3–16)
AST SERPL-CCNC: 17 U/L (ref 15–37)
BASOPHILS # BLD: 0.05 K/UL (ref 0–0.2)
BASOPHILS NFR BLD: 1 %
BILIRUB SERPL-MCNC: <0.2 MG/DL (ref 0–1)
BILIRUB UR QL STRIP: NEGATIVE
BUN SERPL-MCNC: 19 MG/DL (ref 7–20)
CALCIUM SERPL-MCNC: 9.1 MG/DL (ref 8.3–10.6)
CHLORIDE SERPL-SCNC: 103 MMOL/L (ref 99–110)
CLARITY UR: CLEAR
CO2 SERPL-SCNC: 25 MMOL/L (ref 21–32)
COLOR UR: YELLOW
COMMENT: ABNORMAL
CREAT SERPL-MCNC: 0.7 MG/DL (ref 0.5–1)
EOSINOPHIL # BLD: 0.13 K/UL (ref 0–0.6)
EOSINOPHILS RELATIVE PERCENT: 2 %
ERYTHROCYTE [DISTWIDTH] IN BLOOD BY AUTOMATED COUNT: 12.1 % (ref 12.4–15.4)
GFR, ESTIMATED: >90 ML/MIN/1.73M2
GLUCOSE SERPL-MCNC: 97 MG/DL (ref 70–99)
GLUCOSE UR STRIP-MCNC: NEGATIVE MG/DL
HCG UR QL: NEGATIVE
HCT VFR BLD AUTO: 40.9 % (ref 36–48)
HGB BLD-MCNC: 13.2 G/DL (ref 12–16)
HGB UR QL STRIP.AUTO: NEGATIVE
IMM GRANULOCYTES # BLD AUTO: 0.02 K/UL (ref 0–0.5)
IMM GRANULOCYTES NFR BLD: 0 %
KETONES UR STRIP-MCNC: ABNORMAL MG/DL
LACTATE BLDV-SCNC: 0.8 MMOL/L (ref 0.4–1.9)
LEUKOCYTE ESTERASE UR QL STRIP: NEGATIVE
LIPASE SERPL-CCNC: 46 U/L (ref 13–60)
LYMPHOCYTES NFR BLD: 2.4 K/UL (ref 1–5.1)
LYMPHOCYTES RELATIVE PERCENT: 30 %
MCH RBC QN AUTO: 28.1 PG (ref 26–34)
MCHC RBC AUTO-ENTMCNC: 32.3 G/DL (ref 31–36)
MCV RBC AUTO: 87.2 FL (ref 80–100)
MONOCYTES NFR BLD: 0.61 K/UL (ref 0–1.3)
MONOCYTES NFR BLD: 8 %
NEUTROPHILS NFR BLD: 61 %
NEUTS SEG NFR BLD: 4.91 K/UL (ref 1.7–7.7)
NITRITE UR QL STRIP: NEGATIVE
PH UR STRIP: 5.5 [PH] (ref 5–8)
PLATELET # BLD AUTO: 305 K/UL (ref 135–450)
PMV BLD AUTO: 10.4 FL
POTASSIUM SERPL-SCNC: 3.7 MMOL/L (ref 3.5–5.1)
PROT SERPL-MCNC: 7.1 G/DL (ref 6.4–8.2)
PROT UR STRIP-MCNC: NEGATIVE MG/DL
RBC # BLD AUTO: 4.69 M/UL (ref 4–5.2)
SODIUM SERPL-SCNC: 139 MMOL/L (ref 136–145)
SP GR UR STRIP: >1.03 (ref 1–1.03)
UROBILINOGEN UR STRIP-ACNC: 0.2 EU/DL (ref 0–1)
WBC OTHER # BLD: 8.1 K/UL (ref 4–11)

## 2024-05-05 PROCEDURE — 85025 COMPLETE CBC W/AUTO DIFF WBC: CPT

## 2024-05-05 PROCEDURE — 83605 ASSAY OF LACTIC ACID: CPT

## 2024-05-05 PROCEDURE — 81003 URINALYSIS AUTO W/O SCOPE: CPT

## 2024-05-05 PROCEDURE — 6360000004 HC RX CONTRAST MEDICATION: Performed by: EMERGENCY MEDICINE

## 2024-05-05 PROCEDURE — 99285 EMERGENCY DEPT VISIT HI MDM: CPT

## 2024-05-05 PROCEDURE — 74177 CT ABD & PELVIS W/CONTRAST: CPT

## 2024-05-05 PROCEDURE — 83690 ASSAY OF LIPASE: CPT

## 2024-05-05 PROCEDURE — 80053 COMPREHEN METABOLIC PANEL: CPT

## 2024-05-05 PROCEDURE — 84703 CHORIONIC GONADOTROPIN ASSAY: CPT

## 2024-05-05 RX ADMIN — IOPAMIDOL 75 ML: 755 INJECTION, SOLUTION INTRAVENOUS at 23:30

## 2024-05-05 ASSESSMENT — PAIN DESCRIPTION - DESCRIPTORS: DESCRIPTORS: SHARP

## 2024-05-05 ASSESSMENT — PAIN DESCRIPTION - LOCATION: LOCATION: ABDOMEN

## 2024-05-05 ASSESSMENT — PAIN DESCRIPTION - FREQUENCY: FREQUENCY: CONTINUOUS

## 2024-05-05 ASSESSMENT — PAIN SCALES - GENERAL: PAINLEVEL_OUTOF10: 7

## 2024-05-05 ASSESSMENT — PAIN - FUNCTIONAL ASSESSMENT: PAIN_FUNCTIONAL_ASSESSMENT: 0-10

## 2024-05-05 ASSESSMENT — LIFESTYLE VARIABLES
HOW OFTEN DO YOU HAVE A DRINK CONTAINING ALCOHOL: MONTHLY OR LESS
HOW MANY STANDARD DRINKS CONTAINING ALCOHOL DO YOU HAVE ON A TYPICAL DAY: 1 OR 2

## 2024-05-05 ASSESSMENT — PAIN DESCRIPTION - ORIENTATION: ORIENTATION: MID

## 2024-05-05 ASSESSMENT — PAIN DESCRIPTION - PAIN TYPE: TYPE: SURGICAL PAIN

## 2024-05-06 VITALS
HEIGHT: 66 IN | BODY MASS INDEX: 31.85 KG/M2 | OXYGEN SATURATION: 99 % | SYSTOLIC BLOOD PRESSURE: 142 MMHG | TEMPERATURE: 98.6 F | WEIGHT: 198.2 LBS | RESPIRATION RATE: 16 BRPM | DIASTOLIC BLOOD PRESSURE: 81 MMHG | HEART RATE: 73 BPM

## 2024-05-06 NOTE — ED PROVIDER NOTES
[facility] EMERGENCY DEPT     EMERGENCY DEPARTMENT ENCOUNTER            Pt Name: Cecelia Luna   MRN: [id number]   Birthdate 1980   Date of evaluation: 5/5/2024   Provider: Kurt Quintanilla MD   PCP: Carmina Her MD   Note Started: 10:07 PM EDT 5/5/24          CHIEF COMPLAINT     Chief Complaint   Patient presents with    Abdominal Pain             HISTORY OF PRESENT ILLNESS:   History from : Patient   Limitations to history : None     Cecelia Luna is a 44 y.o. female who presents complaining of periumbilical pain.  This patient states on April 20 that she underwent a lap cholecystectomy at [facility].  She states she was traveling down in [place] at the time when she developed pain.  She was worked up and evaluated and felt to have acute cholecystitis.  She underwent a lap sarah.  She states she had some postoperative pain that got better but over the last 5 to 7 days she has had increasing periumbilical pain.  She only complains of pain there.  She is prescribed oxycodone but she has been trying to manage it with Tylenol.  2 days ago she stopped taking the Tylenol because she was afraid she was masking a fever.  She states that she has had a complication with the prior surgery and she was afraid that this might be going on now.  She rates her pain a 7 out of 10.  Worse with certain movements.  No fever.  No chills.  No dysuria.  No frequency.    Nursing Notes were all reviewed and agreed with, or any disagreements were addressed in the HPI.     REVIEW OF SYSTEMS :    Review of Systems   Constitutional:  Negative for fever.   HENT:  Negative for rhinorrhea and sore throat.    Eyes:  Negative for redness.   Respiratory:  Negative for shortness of breath.    Cardiovascular:  Negative for chest pain.   Gastrointestinal:  Positive for abdominal pain. Negative for nausea and vomiting.   Genitourinary:  Negative for dysuria, flank pain and frequency.

## 2024-05-06 NOTE — ED TRIAGE NOTES
Patient presents ambulatory to room 04 unaccompanied w/ c/o abd pain. Patient is 2 weeks post op from a lap sarah. Started with abd pain 5 days ago around the umbilicus. Denies twisting, turning, and heavy lifting.

## 2024-05-07 ENCOUNTER — CARE COORDINATION (OUTPATIENT)
Dept: OTHER | Facility: CLINIC | Age: 44
End: 2024-05-07

## 2024-05-07 RX ORDER — IBUPROFEN 200 MG
200 TABLET ORAL 2 TIMES DAILY
COMMUNITY

## 2024-05-07 RX ORDER — ACETAMINOPHEN 500 MG
1000 TABLET ORAL 2 TIMES DAILY PRN
COMMUNITY

## 2024-05-07 NOTE — CARE COORDINATION
Ambulatory Care Coordination Note  2024    Patient Current Location:  Home: [facility] [address] [place] [id number]    ACM contacted the patient by telephone. Verified name and  with patient as identifiers. Provided introduction to self, and explanation of the ACM role.     ACM: Carmen William RN    Pt is off work until 6/3 she has completed the [facility] paperwork and it has been approved. Pt rates her pain a 5/10 she is taking tylenol and ibuprofen BID. She had gastric by pass in  so she is limited on po intake amounts she said its an ongoing struggle to stay hydrated. She did have ketones in her urine on the ED visit. She has a hematoma around the area of her belly button, it hurts her to walk and move. Pt said they expected it will not resolve for up to 4- 6 weeks. She has an appt with Dr Galvan this Thursday for follow up. 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 Pt states he bowels are moving, she denies any nausea or vomiting no fever. Pt has good support at home I will follow up with patient     Ambulatory Care Coordination Assessment    Care Coordination Protocol  Referral from Primary Care Provider: No  Week 1 - Initial Assessment     Do you have all of your prescriptions and are they filled?: Yes  Barriers to medication adherence: None  Are you able to afford your medications?: Yes  How often do you have trouble taking your medications the way you have been told to take them?: I always take them as prescribed.     Do you have Home O2 Therapy?: No      Ability to seek help/take action for Emergent Urgent situations i.e. fire, crime, inclement weather or health crisis.: Independent  Ability to ambulate to restroom: Independent  Ability handle personal hygeine needs (bathing/dressing/grooming): Independent  Ability to manage Medications: Independent  Ability to prepare Food Preparation: Independent  Ability to maintain home (clean home, laundry): Independent  Ability to drive and/or has

## 2024-05-09 ENCOUNTER — OFFICE VISIT (OUTPATIENT)
Age: 44
End: 2024-05-09
Payer: COMMERCIAL

## 2024-05-09 VITALS
WEIGHT: 205 LBS | OXYGEN SATURATION: 97 % | BODY MASS INDEX: 32.95 KG/M2 | SYSTOLIC BLOOD PRESSURE: 121 MMHG | HEIGHT: 66 IN | HEART RATE: 77 BPM | DIASTOLIC BLOOD PRESSURE: 80 MMHG

## 2024-05-09 DIAGNOSIS — Z90.49 STATUS POST LAPAROSCOPIC CHOLECYSTECTOMY: Primary | ICD-10-CM

## 2024-05-09 PROCEDURE — 99203 OFFICE O/P NEW LOW 30 MIN: CPT | Performed by: SURGERY

## 2024-05-09 ASSESSMENT — ENCOUNTER SYMPTOMS: ABDOMINAL PAIN: 1

## 2024-05-09 NOTE — PROGRESS NOTES
Subjective   Patient ID: Cecelia Luna is a 44 y.o. female.    HPI  Chief Complaint   Patient presents with    New Patient     C/o abd pain - pt is s/p cholecystectomy 4/20/2024     Patient referred by Dr. Her for evaluation of postop pain. Patient reports symptoms of pain and swelling. Location of symptoms is periumbilical. Symptoms were first noted about 10 days after a lap sarah at [place].  She was moving into a new place was more active at that time.   Pain has been fairly persistent for the last 5 days.  alleviated by nothing.  Symptoms aggravated by palpation of the area, bending over.  She is otherwise tolerating regular p.o. without worsening of her baseline loose stools.  Previous evaluation includes CT abdomen pelvis with likely periumbilical hematoma otherwise unremarkable.  No free fluid.  LFTs within normal limit. Patient has a history of previous sleeve gastrectomy. Will plan following treatment: Observation is hematoma will resorb with time.        No past medical history on file.    Past Surgical History:   Procedure Laterality Date    BREAST BIOPSY      BREAST SURGERY      CHOLECYSTECTOMY  04/20/2024    COSMETIC SURGERY  1999    Breast reduction    HERNIA REPAIR  12/20/2021    During gastric sleeve procedure    SLEEVE GASTRECTOMY N/A 12/20/2021    LAPAROSCOPIC SLEEVE GASTRECTOMY, repair hiatal hernia performed by Hal Pittman DO at [facility] OR    TONSILLECTOMY         Current Outpatient Medications   Medication Sig Dispense Refill    acetaminophen (TYLENOL) 500 MG tablet Take 2 tablets by mouth 2 times daily as needed for Pain      ibuprofen (ADVIL;MOTRIN) 200 MG tablet Take 1 tablet by mouth in the morning and 1 tablet in the evening.      Multiple Vitamins-Minerals (WOMENS MULTIVITAMIN PLUS PO) Take by mouth daily Multivitamin gel cap.      calcium citrate-vitamin D (CITRICAL + D) 315-250 MG-UNIT TABS per tablet Take 1 tablet by mouth 2 times daily (with meals) Calcium citrate

## 2024-05-14 ENCOUNTER — CARE COORDINATION (OUTPATIENT)
Dept: OTHER | Facility: CLINIC | Age: 44
End: 2024-05-14

## 2024-05-14 NOTE — CARE COORDINATION
Ambulatory Care Coordination Note  2024    Patient Current Location:  Home: [facility] [address] [place] [id number]     ACM contacted the patient by telephone. Verified name and  with patient as identifiers. Provided introduction to self, and explanation of the ACM role.     Pt is improving. She saw Dr Galvan. The pain is getting better she said. The hematoma should self absorb in 4-6 weeks . Taking ibuprofen and tylenol for pain as needed. She will RTW on . Pt had no further identified care needs at this time ACM will sign off at this time   Care Coordination Interventions    Referral from Primary Care Provider: No  Suggested Interventions and Community Resources          Goals Addressed                   This Visit's Progress     COMPLETED: Conditions and Symptoms        I will schedule office visits, as directed by my provider.  I will keep my appointment or reschedule if I have to cancel.  I will notify my provider of any barriers to my plan of care.  I will notify my provider of any symptoms that indicate a worsening of my condition.    Barriers: stress  Plan for overcoming my barriers: Continue to openly engage with ACM and providers to promote success in reaching goals    Confidence: 10/10  Anticipated Goal Completion Date: 24 and ongoing  - goal met                 Future Appointments   Date Time Provider Department Center   2024  8:00 AM Carmina Her MD [facility]

## 2024-05-23 ENCOUNTER — OFFICE VISIT (OUTPATIENT)
Dept: PRIMARY CARE CLINIC | Age: 44
End: 2024-05-23
Payer: COMMERCIAL

## 2024-05-23 VITALS
DIASTOLIC BLOOD PRESSURE: 84 MMHG | SYSTOLIC BLOOD PRESSURE: 132 MMHG | OXYGEN SATURATION: 99 % | WEIGHT: 207.8 LBS | BODY MASS INDEX: 33.54 KG/M2 | HEART RATE: 77 BPM | TEMPERATURE: 98 F

## 2024-05-23 DIAGNOSIS — T81.41XA SUPERFICIAL INCISIONAL INFECTION OF SURGICAL SITE: Primary | ICD-10-CM

## 2024-05-23 PROCEDURE — 99213 OFFICE O/P EST LOW 20 MIN: CPT | Performed by: STUDENT IN AN ORGANIZED HEALTH CARE EDUCATION/TRAINING PROGRAM

## 2024-05-23 RX ORDER — SULFAMETHOXAZOLE AND TRIMETHOPRIM 800; 160 MG/1; MG/1
1 TABLET ORAL 2 TIMES DAILY
Qty: 14 TABLET | Refills: 0 | Status: SHIPPED | OUTPATIENT
Start: 2024-05-23 | End: 2024-05-30

## 2024-05-23 SDOH — ECONOMIC STABILITY: FOOD INSECURITY: WITHIN THE PAST 12 MONTHS, THE FOOD YOU BOUGHT JUST DIDN'T LAST AND YOU DIDN'T HAVE MONEY TO GET MORE.: NEVER TRUE

## 2024-05-23 SDOH — ECONOMIC STABILITY: FOOD INSECURITY: WITHIN THE PAST 12 MONTHS, YOU WORRIED THAT YOUR FOOD WOULD RUN OUT BEFORE YOU GOT MONEY TO BUY MORE.: NEVER TRUE

## 2024-05-23 SDOH — ECONOMIC STABILITY: INCOME INSECURITY: HOW HARD IS IT FOR YOU TO PAY FOR THE VERY BASICS LIKE FOOD, HOUSING, MEDICAL CARE, AND HEATING?: NOT HARD AT ALL

## 2024-05-23 SDOH — ECONOMIC STABILITY: TRANSPORTATION INSECURITY
IN THE PAST 12 MONTHS, HAS LACK OF TRANSPORTATION KEPT YOU FROM MEETINGS, WORK, OR FROM GETTING THINGS NEEDED FOR DAILY LIVING?: NO

## 2024-05-23 ASSESSMENT — PATIENT HEALTH QUESTIONNAIRE - PHQ9
2. FEELING DOWN, DEPRESSED OR HOPELESS: NOT AT ALL
SUM OF ALL RESPONSES TO PHQ QUESTIONS 1-9: 0
SUM OF ALL RESPONSES TO PHQ QUESTIONS 1-9: 0
SUM OF ALL RESPONSES TO PHQ9 QUESTIONS 1 & 2: 0
SUM OF ALL RESPONSES TO PHQ QUESTIONS 1-9: 0
1. LITTLE INTEREST OR PLEASURE IN DOING THINGS: NOT AT ALL
SUM OF ALL RESPONSES TO PHQ QUESTIONS 1-9: 0

## 2024-05-23 NOTE — PROGRESS NOTES
Cecelia Luna (:  1980) is a 44 y.o. female,Established patient, here for evaluation of the following chief complaint(s):  Wound Check (Stomach pus and pain  2-3 days ago  s/p laparoscopy )      Assessment & Plan   1. Superficial incisional infection of surgical site  -     sulfamethoxazole-trimethoprim (BACTRIM DS;SEPTRA DS) 800-160 MG per tablet; Take 1 tablet by mouth 2 times daily for 7 days, Disp-14 tablet, R-0Normal  Hibiclens, mild soap and water  Keep covered  Change frequently if saturated      Return if symptoms worsen or fail to improve.       Subjective   HPI    Has some pus coming ut of top of belly button and started draining a couple days ago no fevers and no other pain and otherwise doing well.       Review of Systems   All other systems reviewed and are negative.         Objective   Physical Exam  Vitals reviewed.   Constitutional:       General: She is not in acute distress.     Appearance: Normal appearance. She is not ill-appearing.   HENT:      Head: Normocephalic and atraumatic.      Right Ear: External ear normal.      Left Ear: External ear normal.   Eyes:      General: No scleral icterus.        Right eye: No discharge.         Left eye: No discharge.      Extraocular Movements: Extraocular movements intact.      Conjunctiva/sclera: Conjunctivae normal.   Pulmonary:      Effort: Pulmonary effort is normal. No respiratory distress.   Musculoskeletal:      Cervical back: Neck supple.   Skin:     Coloration: Skin is not jaundiced.      Findings: No rash.      Comments: Superior border of navel with exudate and mild induration and slight tenderness superficially  Otherwise normal    Neurological:      Mental Status: She is alert.      Cranial Nerves: No cranial nerve deficit.      Coordination: Coordination normal.   Psychiatric:         Mood and Affect: Mood normal.                  An electronic signature was used to authenticate this note.    --Carmina Her MD

## 2025-01-21 ENCOUNTER — TELEMEDICINE ON DEMAND (OUTPATIENT)
Age: 45
End: 2025-01-21
Payer: COMMERCIAL

## 2025-01-21 ENCOUNTER — HOSPITAL ENCOUNTER (OUTPATIENT)
Age: 45
Discharge: HOME | End: 2025-01-21
Payer: COMMERCIAL

## 2025-01-21 DIAGNOSIS — R05.1 ACUTE COUGH: ICD-10-CM

## 2025-01-21 DIAGNOSIS — R06.02 SOB (SHORTNESS OF BREATH): ICD-10-CM

## 2025-01-21 DIAGNOSIS — R06.02 SOB (SHORTNESS OF BREATH): Primary | ICD-10-CM

## 2025-01-21 PROCEDURE — 99213 OFFICE O/P EST LOW 20 MIN: CPT | Performed by: NURSE PRACTITIONER

## 2025-01-21 PROCEDURE — 71046 X-RAY EXAM CHEST 2 VIEWS: CPT

## 2025-01-21 RX ORDER — BENZONATATE 200 MG/1
200 CAPSULE ORAL 3 TIMES DAILY PRN
Qty: 30 CAPSULE | Refills: 0 | Status: SHIPPED | OUTPATIENT
Start: 2025-01-21 | End: 2025-01-31

## 2025-01-21 ASSESSMENT — ENCOUNTER SYMPTOMS
SHORTNESS OF BREATH: 1
WHEEZING: 0
COUGH: 1

## 2025-01-21 NOTE — PROGRESS NOTES
Cecelia Luna, was evaluated through a synchronous (real-time) audio-video encounter. The patient (or guardian if applicable) is aware that this is a billable service, which includes applicable co-pays. This Virtual Visit was conducted with patient's (and/or legal guardian's) consent. Patient identification was verified, and a caregiver was present when appropriate.   The patient was located at Other: in parked car in [place]  Provider was located at Home (Appt Dept State): [place]  Confirm you are appropriately licensed, registered, or certified to deliver care in the state where the patient is located as indicated above. If you are not or unsure, please re-schedule the visit: Yes, I confirm.     Cecelia Luna (:  1980) is a Established patient, presenting virtually for evaluation of the following:    X3 days, SOB, fatigue, semi productive cough      Below is the assessment and plan developed based on review of pertinent history, physical exam, labs, studies, and medications.    Assessment & Plan  SOB (shortness of breath)    Problem    Orders:    XR CHEST (2 VW); Future  We will call the results when made available  See patient instructions    Shortness of breath has many causes. Sometimes conditions such as anxiety can lead to shortness of breath. Some people get mild shortness of breath when they exercise. Trouble breathing also can be a symptom of a serious problem, such as asthma, lung disease, emphysema, heart problems, and pneumonia.  If your shortness of breath continues, you may need tests and treatment. Watch for any changes in your breathing and other symptoms.  Follow-up care is a key part of your treatment and safety. Be sure to make and go to all appointments, and call your doctor if you are having problems. It's also a good idea to know your test results and keep a list of the medicines you take.  How can you care for yourself at home?  Do not smoke or allow others to smoke around you. If

## (undated) DEVICE — SUTURE ABSRB L30CM 2-0 VLT SPRL PDS + STRATAFIX SXPP1B410

## (undated) DEVICE — TROCAR: Brand: KII FIOS FIRST ENTRY

## (undated) DEVICE — APPLICATOR MEDICATED 26 CC SOLUTION HI LT ORNG CHLORAPREP

## (undated) DEVICE — GLOVE ORANGE PI 8   MSG9080

## (undated) DEVICE — SHEARS LAP L45CM DIA5MM ULTRASONIC CRV TIP ADV HEMSTAS HARM

## (undated) DEVICE — GENERAL LAPAROSCOPIC: Brand: MEDLINE INDUSTRIES, INC.

## (undated) DEVICE — GARMENT,MEDLINE,DVT,INT,CALF,LG, GEN2: Brand: MEDLINE

## (undated) DEVICE — STAPLER SKIN L440MM 32MM LNG 12 FIRING B FRM PWR + GRIPPING

## (undated) DEVICE — SUTURE MCRYL SZ 3-0 L27IN ABSRB UD L24MM PS-1 3/8 CIR PRIM Y936H

## (undated) DEVICE — TROCAR: Brand: KII OPTICAL ACCESS SYSTEM

## (undated) DEVICE — 34" SINGLE PATIENT USE HOVERMATT HALF MATT: Brand: SINGLE PATIENT USE HOVERMATT

## (undated) DEVICE — BLADE CLP TAPR HD WET DRY CAPABILITY GTT IN CHARGING USE

## (undated) DEVICE — RELOAD STPL L60MM H1.5-3.6MM REG TISS BLU GRIPPING SURF B

## (undated) DEVICE — GLOVE SURG SZ 8 L12IN FNGR THK79MIL GRN LTX FREE

## (undated) DEVICE — RELOAD STPL H1.8-3.8MM REG THCK TISS G 6 ROW GRIPPING SURF

## (undated) DEVICE — TROCAR: Brand: KII SLEEVE

## (undated) DEVICE — SOLUTION ANTIFOG VIS SYS CLEARIFY LAPSCP

## (undated) DEVICE — TOWEL,STOP FLAG GOLD N-W: Brand: MEDLINE

## (undated) DEVICE — RELOAD STPL H4.1X2MM DIA60MM THCK TISS GRN 6 ROW PWR GST B

## (undated) DEVICE — APPLICATOR LAP 35 CM 2 RIGID VISTASEAL

## (undated) DEVICE — GOWN,SIRUS,POLYRNF,BRTHSLV,XLN/XXL,18/CS: Brand: MEDLINE

## (undated) DEVICE — POUCH SPEC RETRV ENDO 5X7 IN MEM WIRE